# Patient Record
Sex: FEMALE | Race: WHITE | NOT HISPANIC OR LATINO | Employment: FULL TIME | URBAN - METROPOLITAN AREA
[De-identification: names, ages, dates, MRNs, and addresses within clinical notes are randomized per-mention and may not be internally consistent; named-entity substitution may affect disease eponyms.]

---

## 2018-10-10 ENCOUNTER — OFFICE VISIT (OUTPATIENT)
Dept: INTERNAL MEDICINE | Facility: CLINIC | Age: 43
End: 2018-10-10
Attending: INTERNAL MEDICINE
Payer: COMMERCIAL

## 2018-10-10 VITALS
SYSTOLIC BLOOD PRESSURE: 118 MMHG | HEIGHT: 69 IN | HEART RATE: 79 BPM | WEIGHT: 278.69 LBS | OXYGEN SATURATION: 99 % | BODY MASS INDEX: 41.28 KG/M2 | DIASTOLIC BLOOD PRESSURE: 60 MMHG

## 2018-10-10 DIAGNOSIS — Z12.39 SCREENING FOR BREAST CANCER: ICD-10-CM

## 2018-10-10 DIAGNOSIS — Z78.9 VEGAN DIET: ICD-10-CM

## 2018-10-10 DIAGNOSIS — F41.9 ANXIETY AND DEPRESSION: ICD-10-CM

## 2018-10-10 DIAGNOSIS — F32.A ANXIETY AND DEPRESSION: ICD-10-CM

## 2018-10-10 DIAGNOSIS — Z00.00 ANNUAL PHYSICAL EXAM: Primary | ICD-10-CM

## 2018-10-10 DIAGNOSIS — G47.00 INSOMNIA, UNSPECIFIED TYPE: ICD-10-CM

## 2018-10-10 PROCEDURE — 99386 PREV VISIT NEW AGE 40-64: CPT | Mod: S$GLB,,, | Performed by: INTERNAL MEDICINE

## 2018-10-10 PROCEDURE — 99999 PR PBB SHADOW E&M-NEW PATIENT-LVL IV: CPT | Mod: PBBFAC,,, | Performed by: INTERNAL MEDICINE

## 2018-10-10 RX ORDER — ESCITALOPRAM OXALATE 20 MG/1
TABLET ORAL
COMMUNITY
Start: 2017-06-20 | End: 2018-11-07

## 2018-10-10 RX ORDER — TRAZODONE HYDROCHLORIDE 100 MG/1
TABLET ORAL
COMMUNITY
Start: 2017-06-20 | End: 2019-09-05

## 2018-10-10 RX ORDER — LORAZEPAM 1 MG/1
TABLET ORAL
COMMUNITY
Start: 2017-06-20 | End: 2019-02-01

## 2018-10-10 RX ORDER — TRAZODONE HYDROCHLORIDE 100 MG/1
TABLET ORAL
Refills: 11 | COMMUNITY
Start: 2018-09-04 | End: 2018-10-10 | Stop reason: SDUPTHER

## 2018-10-10 RX ORDER — BUSPIRONE HYDROCHLORIDE 5 MG/1
5 TABLET ORAL 2 TIMES DAILY
Refills: 1 | COMMUNITY
Start: 2018-09-13 | End: 2018-10-10 | Stop reason: SDUPTHER

## 2018-10-10 RX ORDER — ESCITALOPRAM OXALATE 20 MG/1
20 TABLET ORAL DAILY
Refills: 1 | COMMUNITY
Start: 2018-08-16 | End: 2018-10-10 | Stop reason: SDUPTHER

## 2018-10-10 RX ORDER — SERTRALINE HYDROCHLORIDE 50 MG/1
50 TABLET, FILM COATED ORAL DAILY
Qty: 30 TABLET | Refills: 1 | Status: SHIPPED | OUTPATIENT
Start: 2018-10-10 | End: 2018-11-07

## 2018-10-10 RX ORDER — BUSPIRONE HYDROCHLORIDE 5 MG/1
TABLET ORAL
COMMUNITY
Start: 2018-06-20 | End: 2018-11-07

## 2018-10-10 RX ORDER — LORAZEPAM 1 MG/1
TABLET ORAL
Refills: 2 | COMMUNITY
Start: 2018-08-16 | End: 2018-10-10 | Stop reason: SDUPTHER

## 2018-10-10 NOTE — PROGRESS NOTES
Subjective:   Patient ID: Noemi Murillo is a 43 y.o. female  Chief complaint:   Chief Complaint   Patient presents with    [facility]  Pt here to [facility] care and for annual exam     Hx of anxiety and depression:  Currently taking:  buspar 5mg twice daily - would like to stop this as does not think this is helpful   lexapro 20mg - would like to stop - has been on > 1 year - mildly helpful    Lorazepam taking 1mg nightly and occ during day with panic attacks  Trazodone 200mg nightly for sleep nightly   - tried wellbutrin - felt anxiety worsened and inc panic attacks and stopped  - tried remeron - felt like brain foggy and stopped  Depression and anxiety - not in counseling due to cost currently   - not followed by psychiatry   - denies si/hi/mak    Reports awakens at night   Ended relationship with her father recently and this has worsened sleep and anxiety over past week     - awakens nervous and worrying  + snoring   - never been eval for anne      ambien not effective   Did not try lunesta   Tried benadryl, melatonin, nightly yoga meditation   Avoiding blue screens 1 hour before   Sleeping in Cool, dark room - wears ear plugs   - gets up at 3am in mornings for work and goes to sleep to 7pm   - has black out curtains   - recently switched to 6am shift 2 days per week and 4am 2 days per week      Review of Systems   Constitutional: Negative for activity change and unexpected weight change.   HENT: Positive for hearing loss and rhinorrhea. Negative for trouble swallowing.    Eyes: Positive for visual disturbance. Negative for discharge.   Respiratory: Negative for chest tightness and wheezing.    Cardiovascular: Negative for chest pain and palpitations.   Gastrointestinal: Negative for blood in stool, constipation, diarrhea and vomiting.   Endocrine: Negative for polydipsia and polyuria.   Genitourinary: Positive for menstrual problem. Negative for difficulty urinating, dysuria and hematuria.  "  Musculoskeletal: Positive for arthralgias and neck pain. Negative for joint swelling.   Skin: Negative for color change and rash.   Neurological: Positive for headaches. Negative for weakness.   Psychiatric/Behavioral: Positive for dysphoric mood and sleep disturbance. Negative for confusion, hallucinations, self-injury and suicidal ideas.     FIDE entered by pt 2 days prior to appt     Objective:  Vitals:    10/10/18 0929   BP: 118/60   Pulse: 79   SpO2: 99%   Weight: 126.4 kg (278 lb 10.6 oz)   Height: 5' 9" (1.753 m)     Body mass index is 41.15 kg/m².    Physical Exam   Constitutional: She is oriented to person, place, and time. She appears well-developed and well-nourished.   HENT:   Head: Normocephalic and atraumatic.   Right Ear: External ear normal.   Left Ear: External ear normal.   Nose: Nose normal.   Mouth/Throat: Oropharynx is clear and moist. No oropharyngeal exudate.   No carotid bruits   Eyes: Conjunctivae and EOM are normal.   Neck: Neck supple. No thyromegaly present.   Cardiovascular: Normal rate, regular rhythm, normal heart sounds and intact distal pulses.   Pulmonary/Chest: Effort normal and breath sounds normal.   Abdominal: Soft. Bowel sounds are normal.   Musculoskeletal: She exhibits no edema or tenderness.   Lymphadenopathy:     She has no cervical adenopathy.   Neurological: She is alert and oriented to person, place, and time.   Skin: Skin is warm and dry.   Psychiatric: Her behavior is normal. Thought content normal.   Vitals reviewed.      Assessment:  1. Annual physical exam    2. Vegan diet    3. Anxiety and depression    4. Insomnia, unspecified type    5. Screening for breast cancer        Plan:  Noemi was seen today for establish care.    Diagnoses and all orders for this visit:    Annual physical exam  -     Lipid panel; Future  -     TSH; Future  -     CBC auto differential; Future  -     Comprehensive metabolic panel; Future  -     Vitamin B12; Future  -     Vitamin D; " Future  Recommend daily sunscreen, cardiovascular exercise min 30 min 5 days per week. Seatbelts routinely.    Vegan diet  -     Vitamin B12; Future  -     Vitamin D; Future    Anxiety and depression  See below     Insomnia, unspecified type  -     Ambulatory consult to Sleep Disorders    Screening for breast cancer  -     Mammo Digital Screening Bilat with Tomosynthesis CAD; Future    Other orders  -     sertraline (ZOLOFT) 50 MG tablet; Take 1 tablet (50 mg total) by mouth once daily.    dec lexapro to 10mg x 7-10 days and start zoloft 50m,g   rtc in 4-6 weeks for med check     Health Maintenance   Topic Date Due    Lipid Panel  1975    Mammogram  03/31/2016    Influenza Vaccine  08/01/2018    TETANUS VACCINE  09/08/2020    Pap Smear with HPV Cotest  03/14/2021

## 2018-10-25 ENCOUNTER — PATIENT OUTREACH (OUTPATIENT)
Dept: ADMINISTRATIVE | Facility: HOSPITAL | Age: 43
End: 2018-10-25

## 2018-10-26 ENCOUNTER — PATIENT MESSAGE (OUTPATIENT)
Dept: INTERNAL MEDICINE | Facility: CLINIC | Age: 43
End: 2018-10-26

## 2018-11-05 ENCOUNTER — TELEPHONE (OUTPATIENT)
Dept: INTERNAL MEDICINE | Facility: CLINIC | Age: 43
End: 2018-11-05

## 2018-11-05 DIAGNOSIS — E03.9 ACQUIRED HYPOTHYROIDISM: ICD-10-CM

## 2018-11-05 DIAGNOSIS — D64.9 ANEMIA, UNSPECIFIED TYPE: Primary | ICD-10-CM

## 2018-11-05 LAB
1,25(OH)2D SERPL-MCNC: 35 PG/ML (ref 18–72)
1,25(OH)2D2 SERPL-MCNC: <8 PG/ML
1,25(OH)2D3 SERPL-MCNC: 35 PG/ML
ALBUMIN SERPL-MCNC: 3.9 G/DL (ref 3.6–5.1)
ALBUMIN/GLOB SERPL: 1.4 (CALC) (ref 1–2.5)
ALP SERPL-CCNC: 64 U/L (ref 33–115)
ALT SERPL-CCNC: 10 U/L (ref 6–29)
AST SERPL-CCNC: 20 U/L (ref 10–30)
BASOPHILS # BLD AUTO: 38 CELLS/UL (ref 0–200)
BASOPHILS NFR BLD AUTO: 0.5 %
BILIRUB SERPL-MCNC: 0.6 MG/DL (ref 0.2–1.2)
BUN SERPL-MCNC: 16 MG/DL (ref 7–25)
BUN/CREAT SERPL: NORMAL (CALC) (ref 6–22)
CALCIUM SERPL-MCNC: 8.6 MG/DL (ref 8.6–10.2)
CHLORIDE SERPL-SCNC: 109 MMOL/L (ref 98–110)
CHOLEST SERPL-MCNC: 151 MG/DL
CHOLEST/HDLC SERPL: 4.1 (CALC)
CO2 SERPL-SCNC: 25 MMOL/L (ref 20–32)
CREAT SERPL-MCNC: 0.62 MG/DL (ref 0.5–1.1)
EOSINOPHIL # BLD AUTO: 190 CELLS/UL (ref 15–500)
EOSINOPHIL NFR BLD AUTO: 2.5 %
ERYTHROCYTE [DISTWIDTH] IN BLOOD BY AUTOMATED COUNT: 14.3 % (ref 11–15)
GFR SERPL CREATININE-BSD FRML MDRD: 111 ML/MIN/1.73M2
GLOBULIN SER CALC-MCNC: 2.7 G/DL (CALC) (ref 1.9–3.7)
GLUCOSE SERPL-MCNC: 87 MG/DL (ref 65–99)
HCT VFR BLD AUTO: 35.9 % (ref 35–45)
HDLC SERPL-MCNC: 37 MG/DL
HGB BLD-MCNC: 11.5 G/DL (ref 11.7–15.5)
LDLC SERPL CALC-MCNC: 96 MG/DL (CALC)
LYMPHOCYTES # BLD AUTO: 1763 CELLS/UL (ref 850–3900)
LYMPHOCYTES NFR BLD AUTO: 23.2 %
MCH RBC QN AUTO: 27.8 PG (ref 27–33)
MCHC RBC AUTO-ENTMCNC: 32 G/DL (ref 32–36)
MCV RBC AUTO: 86.7 FL (ref 80–100)
MONOCYTES # BLD AUTO: 722 CELLS/UL (ref 200–950)
MONOCYTES NFR BLD AUTO: 9.5 %
NEUTROPHILS # BLD AUTO: 4887 CELLS/UL (ref 1500–7800)
NEUTROPHILS NFR BLD AUTO: 64.3 %
NONHDLC SERPL-MCNC: 114 MG/DL (CALC)
PLATELET # BLD AUTO: 256 THOUSAND/UL (ref 140–400)
PMV BLD REES-ECKER: 9.8 FL (ref 7.5–12.5)
POTASSIUM SERPL-SCNC: 4.7 MMOL/L (ref 3.5–5.3)
PROT SERPL-MCNC: 6.6 G/DL (ref 6.1–8.1)
RBC # BLD AUTO: 4.14 MILLION/UL (ref 3.8–5.1)
SODIUM SERPL-SCNC: 139 MMOL/L (ref 135–146)
TRIGL SERPL-MCNC: 89 MG/DL
TSH SERPL-ACNC: 7.02 MIU/L
VIT B12 SERPL-MCNC: 403 PG/ML (ref 200–1100)
WBC # BLD AUTO: 7.6 THOUSAND/UL (ref 3.8–10.8)

## 2018-11-05 NOTE — TELEPHONE ENCOUNTER
rec check iron labs now   rec schedule thyroid labs in 8 weeks     rec pt review my recommendations given with lab results - if she is having sx of hypothyroidism, please let me know and will order levothyroxine

## 2018-11-06 ENCOUNTER — PATIENT MESSAGE (OUTPATIENT)
Dept: INTERNAL MEDICINE | Facility: CLINIC | Age: 43
End: 2018-11-06

## 2018-11-06 DIAGNOSIS — E03.9 HYPOTHYROIDISM, UNSPECIFIED TYPE: Primary | ICD-10-CM

## 2018-11-06 RX ORDER — LEVOTHYROXINE SODIUM 25 UG/1
25 TABLET ORAL DAILY
Qty: 90 TABLET | Refills: 0 | Status: SHIPPED | OUTPATIENT
Start: 2018-11-06 | End: 2019-01-15 | Stop reason: SDUPTHER

## 2018-11-07 ENCOUNTER — OFFICE VISIT (OUTPATIENT)
Dept: INTERNAL MEDICINE | Facility: CLINIC | Age: 43
End: 2018-11-07
Attending: INTERNAL MEDICINE
Payer: COMMERCIAL

## 2018-11-07 VITALS
WEIGHT: 282.19 LBS | SYSTOLIC BLOOD PRESSURE: 123 MMHG | OXYGEN SATURATION: 98 % | DIASTOLIC BLOOD PRESSURE: 84 MMHG | HEIGHT: 69 IN | HEART RATE: 73 BPM | BODY MASS INDEX: 41.8 KG/M2

## 2018-11-07 DIAGNOSIS — D64.9 ANEMIA, UNSPECIFIED TYPE: ICD-10-CM

## 2018-11-07 DIAGNOSIS — F32.A ANXIETY AND DEPRESSION: ICD-10-CM

## 2018-11-07 DIAGNOSIS — E03.9 HYPOTHYROIDISM, UNSPECIFIED TYPE: Primary | ICD-10-CM

## 2018-11-07 DIAGNOSIS — F41.9 ANXIETY AND DEPRESSION: ICD-10-CM

## 2018-11-07 PROCEDURE — 99999 PR PBB SHADOW E&M-EST. PATIENT-LVL III: CPT | Mod: PBBFAC,,, | Performed by: INTERNAL MEDICINE

## 2018-11-07 PROCEDURE — 3008F BODY MASS INDEX DOCD: CPT | Mod: CPTII,S$GLB,, | Performed by: INTERNAL MEDICINE

## 2018-11-07 PROCEDURE — 99214 OFFICE O/P EST MOD 30 MIN: CPT | Mod: S$GLB,,, | Performed by: INTERNAL MEDICINE

## 2018-11-07 RX ORDER — SERTRALINE HYDROCHLORIDE 100 MG/1
100 TABLET, FILM COATED ORAL DAILY
Qty: 30 TABLET | Refills: 1 | Status: SHIPPED | OUTPATIENT
Start: 2018-11-07 | End: 2018-12-19

## 2018-11-07 NOTE — PROGRESS NOTES
"Subjective:   Patient ID: Noemi Murillo is a 43 y.o. female  Chief complaint:   Chief Complaint   Patient presents with    Anxiety     f/u    Depression     f/u       HPI   Pt here for f/u of   Hyopthyroidism: is to start levothyroxine today - reviewed sx of fatigue, cold intolerance, dry skin and brittle hair and nails, wt gain and discussed plan to repeat labs in 8 weeks - discussed how to take med correctly as well     Anxiety and depression: weaned off of lexapro - zoloft 50 with no se but not effective at this time. No si/h/corado   Just started with therapist     Anemia: she reports hx of mild anemia in past - currently having heavy periods - mild anemia on labs   - iron labs ordered and she is going today to complete these   - no brbpr or hematuria     Review of Systems    Objective:  Vitals:    11/07/18 1109   BP: 123/84   Pulse: 73   SpO2: 98%   Weight: 128 kg (282 lb 3 oz)   Height: 5' 9" (1.753 m)     Body mass index is 41.67 kg/m².    Physical Exam   Constitutional: She is oriented to person, place, and time. She appears well-developed and well-nourished.   HENT:   Head: Normocephalic and atraumatic.   Eyes: Conjunctivae and EOM are normal.   Neck: Normal range of motion. Neck supple.   Cardiovascular: Normal rate, regular rhythm and intact distal pulses.   Pulmonary/Chest: Effort normal and breath sounds normal.   Abdominal: Soft. Normal appearance and bowel sounds are normal.   Neurological: She is alert and oriented to person, place, and time. She has normal strength. Gait normal.   Skin: Skin is warm, dry and intact. No cyanosis. Nails show no clubbing.   Psychiatric: She has a normal mood and affect. Her speech is normal and behavior is normal. Cognition and memory are normal.   Vitals reviewed.      Assessment:  1. Hypothyroidism, unspecified type    2. Anxiety and depression    3. Anemia, unspecified type        Plan:  Noemi was seen today for anxiety and depression.    Diagnoses and all orders " for this visit:    Hypothyroidism, unspecified type  Start med   Check labs in 8 weeks     Anxiety and depression  Inc zoloft to 100mg   rtc in 4-6 weeks for med check   If not effective she will send message in 3-4 weeks before appt and will inc further   Cont with therapist     Anemia, unspecified type  Mild - attrib to heavy periods - check iron labs     Other orders  -     sertraline (ZOLOFT) 100 MG tablet; Take 1 tablet (100 mg total) by mouth once daily.    Health Maintenance   Topic Date Due    Mammogram  03/31/2016    Influenza Vaccine  08/01/2018    TETANUS VACCINE  09/08/2020    Pap Smear with HPV Cotest  03/14/2021    Lipid Panel  Completed

## 2018-11-09 LAB
FERRITIN SERPL-MCNC: 14 NG/ML (ref 10–232)
IRON SATN MFR SERPL: 12 % (CALC) (ref 11–50)
IRON SERPL-MCNC: 47 MCG/DL (ref 40–190)
T4 FREE SERPL-MCNC: 0.8 NG/DL (ref 0.8–1.8)
TIBC SERPL-MCNC: 397 MCG/DL (CALC) (ref 250–450)
TSH SERPL-ACNC: 6.5 MIU/L

## 2018-11-12 ENCOUNTER — TELEPHONE (OUTPATIENT)
Dept: INTERNAL MEDICINE | Facility: CLINIC | Age: 43
End: 2018-11-12

## 2018-11-12 DIAGNOSIS — E03.9 HYPOTHYROIDISM, UNSPECIFIED TYPE: Primary | ICD-10-CM

## 2018-11-12 NOTE — TELEPHONE ENCOUNTER
Thyroid labs checked too soon   Pt recently started on medication - please arrange labs in 8 weeks

## 2018-11-12 NOTE — TELEPHONE ENCOUNTER
Spoke to Ms. Murillo to inform her that her labs were checked too soon and informed her that Dr. Azul would like for her to arrange labs in 8 weeks.  Patient states that she will call the office back to let us know a available time and date for her.

## 2018-12-05 ENCOUNTER — PATIENT MESSAGE (OUTPATIENT)
Dept: INTERNAL MEDICINE | Facility: CLINIC | Age: 43
End: 2018-12-05

## 2018-12-05 ENCOUNTER — PATIENT OUTREACH (OUTPATIENT)
Dept: ADMINISTRATIVE | Facility: HOSPITAL | Age: 43
End: 2018-12-05

## 2018-12-19 ENCOUNTER — OFFICE VISIT (OUTPATIENT)
Dept: INTERNAL MEDICINE | Facility: CLINIC | Age: 43
End: 2018-12-19
Attending: INTERNAL MEDICINE
Payer: COMMERCIAL

## 2018-12-19 VITALS
HEIGHT: 69 IN | BODY MASS INDEX: 41.24 KG/M2 | DIASTOLIC BLOOD PRESSURE: 62 MMHG | OXYGEN SATURATION: 98 % | SYSTOLIC BLOOD PRESSURE: 106 MMHG | HEART RATE: 86 BPM | WEIGHT: 278.44 LBS

## 2018-12-19 DIAGNOSIS — E03.9 HYPOTHYROIDISM, UNSPECIFIED TYPE: Primary | ICD-10-CM

## 2018-12-19 DIAGNOSIS — F32.A ANXIETY AND DEPRESSION: ICD-10-CM

## 2018-12-19 DIAGNOSIS — F41.9 ANXIETY AND DEPRESSION: ICD-10-CM

## 2018-12-19 PROCEDURE — 99214 OFFICE O/P EST MOD 30 MIN: CPT | Mod: S$GLB,,, | Performed by: INTERNAL MEDICINE

## 2018-12-19 PROCEDURE — 3008F BODY MASS INDEX DOCD: CPT | Mod: CPTII,S$GLB,, | Performed by: INTERNAL MEDICINE

## 2018-12-19 PROCEDURE — 99999 PR PBB SHADOW E&M-EST. PATIENT-LVL III: CPT | Mod: PBBFAC,,, | Performed by: INTERNAL MEDICINE

## 2018-12-19 RX ORDER — SERTRALINE HYDROCHLORIDE 100 MG/1
150 TABLET, FILM COATED ORAL DAILY
Qty: 135 TABLET | Refills: 1 | Status: SHIPPED | OUTPATIENT
Start: 2018-12-19 | End: 2019-08-09 | Stop reason: SDUPTHER

## 2018-12-19 NOTE — PROGRESS NOTES
"Subjective:   Patient ID: Noemi Murillo is a 43 y.o. female  Chief complaint: No chief complaint on file.      HPI   Pt here for f/u of hypothyroidism and med check - zoloft     Hypothyroidism: Less cold intol and dry skin   sigrid med   Due for repeat labs in 2-3 weeks [facility]     Anxiety and depression - reviewed email 12/5 - inc stress at work due to holidays and inc work load however feeling better overall   zoloft - sigrid med   Doing well with therapy at this time - mood has improved   Working to inc exercise - reports very active work day     Will schedule mmg - order is signed    Review of Systems    Objective:  Vitals:    12/19/18 1003   BP: 106/62   Pulse: 86   SpO2: 98%   Weight: 126.3 kg (278 lb 7.1 oz)   Height: 5' 9" (1.753 m)     Body mass index is 41.12 kg/m².    Physical Exam   Constitutional: She is oriented to person, place, and time. She appears well-developed and well-nourished.   HENT:   Head: Normocephalic and atraumatic.   Eyes: Conjunctivae and EOM are normal.   Neck: Normal range of motion. Neck supple.   Cardiovascular: Normal rate, regular rhythm and intact distal pulses.   Pulmonary/Chest: Effort normal and breath sounds normal.   Abdominal: Soft. Normal appearance and bowel sounds are normal.   Musculoskeletal: She exhibits no edema or tenderness.   Neurological: She is alert and oriented to person, place, and time. She has normal strength. Gait normal.   Skin: Skin is warm, dry and intact. No cyanosis. Nails show no clubbing.   Psychiatric: She has a normal mood and affect. Her speech is normal and behavior is normal. Cognition and memory are normal.   Vitals reviewed.      Assessment:  1. Hypothyroidism, unspecified type    2. Anxiety and depression        Plan:  Diagnoses and all orders for this visit:    Hypothyroidism, unspecified type  -     TSH; Future  -     T4, free; Future  Cont med - check labs   Will titrate med based on lab findings    Anxiety and depression  Improved - " will inc zoloft to 150mg - she will let me know if any issues with inc dose   - if doing well on current dose then will send message in 4-6 weeks and will send in refills - 90 day     Other orders  -     sertraline (ZOLOFT) 100 MG tablet; Take 1.5 tablets (150 mg total) by mouth once daily.    Health Maintenance   Topic Date Due    Mammogram  03/31/2016    TETANUS VACCINE  09/08/2020    Pap Smear with HPV Cotest  03/14/2021    Influenza Vaccine  Completed    Lipid Panel  Completed

## 2019-01-10 ENCOUNTER — PATIENT MESSAGE (OUTPATIENT)
Dept: INTERNAL MEDICINE | Facility: CLINIC | Age: 44
End: 2019-01-10

## 2019-01-13 ENCOUNTER — PATIENT MESSAGE (OUTPATIENT)
Dept: INTERNAL MEDICINE | Facility: CLINIC | Age: 44
End: 2019-01-13

## 2019-01-13 DIAGNOSIS — E03.9 HYPOTHYROIDISM, UNSPECIFIED TYPE: Primary | ICD-10-CM

## 2019-01-14 ENCOUNTER — TELEPHONE (OUTPATIENT)
Dept: OBSTETRICS AND GYNECOLOGY | Facility: CLINIC | Age: 44
End: 2019-01-14

## 2019-01-14 NOTE — TELEPHONE ENCOUNTER
rec check thyroid labs now - [facility] - agree with going today     Needs to f/u with gyn for irregular periods    Is she still taking trazodone? I have 200mg written in my note at first appt but 100mg is listed in chart - please clarify with pt     Recommend continue good sleep hygiene and work to increase exercise to help with mood and sleep.   Will consider other med changes after thyroid labs return.     Please let her know recommendations

## 2019-01-14 NOTE — TELEPHONE ENCOUNTER
"----- Message from Bettina Bloom sent at 1/14/2019 11:19 AM CST -----  Contact: LUZ MORENO [[id number]]  Name of Who is Calling: LUZ MORENO [[id number]]      What is the request in detail:    Patient called and said, "she's returning your call and would like you to please call again."      THANKS!      Can the clinic reply by [facility]:  NO      Number to Call Back: LUZ MORENO / # [phone]                                    "

## 2019-01-14 NOTE — TELEPHONE ENCOUNTER
Spoke with pt and she states that she takes 200mg of Trazadone and by itself does not work well. Checking with Dr. Azul to see if the [facility] have [facility] no lab were visible that they are ordered. Pt going to lab later to day

## 2019-01-14 NOTE — TELEPHONE ENCOUNTER
----- Message from Silvina Bruno sent at 1/14/2019  9:51 AM CST -----  Contact: LUZ MORENO [[id number]]  Name of Who is Calling: LUZ MORENO [[id number]]      What is the request in detail: patient would like to schedule new patient appt for irregular cycle. Please call       Can the clinic reply by [facility]: no    What Number to Call Back if not in [facility]: [phone]

## 2019-01-14 NOTE — TELEPHONE ENCOUNTER
Thank you for clarifying trazodone dose    Reordered tsh and free t4 just in case prior have  - through [facility]     Irregular periods may be in part due to hypothyroidism - will make further adjustments to med pending those results

## 2019-01-15 ENCOUNTER — OFFICE VISIT (OUTPATIENT)
Dept: OBSTETRICS AND GYNECOLOGY | Facility: CLINIC | Age: 44
End: 2019-01-15
Payer: COMMERCIAL

## 2019-01-15 ENCOUNTER — TELEPHONE (OUTPATIENT)
Dept: INTERNAL MEDICINE | Facility: CLINIC | Age: 44
End: 2019-01-15

## 2019-01-15 VITALS
BODY MASS INDEX: 41.55 KG/M2 | SYSTOLIC BLOOD PRESSURE: 120 MMHG | DIASTOLIC BLOOD PRESSURE: 88 MMHG | HEIGHT: 69 IN | WEIGHT: 280.56 LBS

## 2019-01-15 DIAGNOSIS — E03.9 ACQUIRED HYPOTHYROIDISM: Primary | ICD-10-CM

## 2019-01-15 DIAGNOSIS — N92.6 IRREGULAR PERIODS/MENSTRUAL CYCLES: Primary | ICD-10-CM

## 2019-01-15 LAB
T4 FREE SERPL-MCNC: 0.9 NG/DL (ref 0.8–1.8)
TSH SERPL-ACNC: 8.24 MIU/L

## 2019-01-15 PROCEDURE — 99203 PR OFFICE/OUTPT VISIT, NEW, LEVL III, 30-44 MIN: ICD-10-PCS | Mod: S$GLB,,, | Performed by: OBSTETRICS & GYNECOLOGY

## 2019-01-15 PROCEDURE — 3008F BODY MASS INDEX DOCD: CPT | Mod: CPTII,S$GLB,, | Performed by: OBSTETRICS & GYNECOLOGY

## 2019-01-15 PROCEDURE — 3008F PR BODY MASS INDEX (BMI) DOCUMENTED: ICD-10-PCS | Mod: CPTII,S$GLB,, | Performed by: OBSTETRICS & GYNECOLOGY

## 2019-01-15 PROCEDURE — 99999 PR PBB SHADOW E&M-EST. PATIENT-LVL III: CPT | Mod: PBBFAC,,, | Performed by: OBSTETRICS & GYNECOLOGY

## 2019-01-15 PROCEDURE — 99999 PR PBB SHADOW E&M-EST. PATIENT-LVL III: ICD-10-PCS | Mod: PBBFAC,,, | Performed by: OBSTETRICS & GYNECOLOGY

## 2019-01-15 PROCEDURE — 99203 OFFICE O/P NEW LOW 30 MIN: CPT | Mod: S$GLB,,, | Performed by: OBSTETRICS & GYNECOLOGY

## 2019-01-15 RX ORDER — LEVOTHYROXINE SODIUM 50 UG/1
50 TABLET ORAL DAILY
Qty: 60 TABLET | Refills: 0 | Status: SHIPPED | OUTPATIENT
Start: 2019-01-15 | End: 2019-02-01

## 2019-01-15 NOTE — TELEPHONE ENCOUNTER
Message sent to pt via my chart with lab results and updates to plan.   Inc dose of levothyroxine to 50mcg daily   Please arrange tsh and free t4 in 6 weeks - please change to [facility] - thanks!

## 2019-01-29 ENCOUNTER — PATIENT MESSAGE (OUTPATIENT)
Dept: INTERNAL MEDICINE | Facility: CLINIC | Age: 44
End: 2019-01-29

## 2019-01-30 ENCOUNTER — PATIENT OUTREACH (OUTPATIENT)
Dept: ADMINISTRATIVE | Facility: HOSPITAL | Age: 44
End: 2019-01-30

## 2019-01-30 NOTE — TELEPHONE ENCOUNTER
Needs appt to evaluate sx of weakness, dizziness/lightheadedness, lethargy - please arrange urgent appt today or ros as cause of this is unclear     Can increase zoloft to 200mg to help with irritability, poor sleep and depression - please let me know if pt would like to try this.     rec cont thyroid medication and will complete labs in 4 weeks for this

## 2019-02-01 ENCOUNTER — OFFICE VISIT (OUTPATIENT)
Dept: INTERNAL MEDICINE | Facility: CLINIC | Age: 44
End: 2019-02-01
Attending: INTERNAL MEDICINE
Payer: COMMERCIAL

## 2019-02-01 VITALS
WEIGHT: 282.63 LBS | DIASTOLIC BLOOD PRESSURE: 80 MMHG | HEIGHT: 69 IN | OXYGEN SATURATION: 98 % | HEART RATE: 85 BPM | SYSTOLIC BLOOD PRESSURE: 121 MMHG | BODY MASS INDEX: 41.86 KG/M2

## 2019-02-01 DIAGNOSIS — F32.A ANXIETY AND DEPRESSION: ICD-10-CM

## 2019-02-01 DIAGNOSIS — F51.01 PRIMARY INSOMNIA: ICD-10-CM

## 2019-02-01 DIAGNOSIS — E03.9 HYPOTHYROIDISM, UNSPECIFIED TYPE: Primary | ICD-10-CM

## 2019-02-01 DIAGNOSIS — H53.9 VISION CHANGES: ICD-10-CM

## 2019-02-01 DIAGNOSIS — H81.10 BENIGN PAROXYSMAL POSITIONAL VERTIGO, UNSPECIFIED LATERALITY: ICD-10-CM

## 2019-02-01 DIAGNOSIS — F41.9 ANXIETY AND DEPRESSION: ICD-10-CM

## 2019-02-01 PROCEDURE — 99999 PR PBB SHADOW E&M-EST. PATIENT-LVL III: ICD-10-PCS | Mod: PBBFAC,,, | Performed by: INTERNAL MEDICINE

## 2019-02-01 PROCEDURE — 3008F BODY MASS INDEX DOCD: CPT | Mod: CPTII,S$GLB,, | Performed by: INTERNAL MEDICINE

## 2019-02-01 PROCEDURE — 99999 PR PBB SHADOW E&M-EST. PATIENT-LVL III: CPT | Mod: PBBFAC,,, | Performed by: INTERNAL MEDICINE

## 2019-02-01 PROCEDURE — 99214 PR OFFICE/OUTPT VISIT, EST, LEVL IV, 30-39 MIN: ICD-10-PCS | Mod: S$GLB,,, | Performed by: INTERNAL MEDICINE

## 2019-02-01 PROCEDURE — 3008F PR BODY MASS INDEX (BMI) DOCUMENTED: ICD-10-PCS | Mod: CPTII,S$GLB,, | Performed by: INTERNAL MEDICINE

## 2019-02-01 PROCEDURE — 99214 OFFICE O/P EST MOD 30 MIN: CPT | Mod: S$GLB,,, | Performed by: INTERNAL MEDICINE

## 2019-02-01 RX ORDER — LANOLIN ALCOHOL/MO/W.PET/CERES
400 CREAM (GRAM) TOPICAL DAILY
Refills: 0 | COMMUNITY
Start: 2019-02-01 | End: 2019-06-04

## 2019-02-01 RX ORDER — SUMATRIPTAN SUCCINATE 25 MG/1
TABLET ORAL
Qty: 15 TABLET | Refills: 0 | Status: SHIPPED | OUTPATIENT
Start: 2019-02-01 | End: 2019-02-13 | Stop reason: SDUPTHER

## 2019-02-01 RX ORDER — MECLIZINE HCL 12.5 MG 12.5 MG/1
12.5 TABLET ORAL 3 TIMES DAILY PRN
Qty: 60 TABLET | Refills: 0 | Status: SHIPPED | OUTPATIENT
Start: 2019-02-01 | End: 2019-02-13 | Stop reason: SDUPTHER

## 2019-02-01 RX ORDER — LEVOTHYROXINE SODIUM 75 UG/1
75 TABLET ORAL DAILY
Qty: 30 TABLET | Refills: 1 | Status: SHIPPED | OUTPATIENT
Start: 2019-02-01 | End: 2019-02-25 | Stop reason: SDUPTHER

## 2019-02-01 NOTE — PROGRESS NOTES
"Subjective:   Patient ID: Noemi Murillo is a 43 y.o. female  Chief complaint:   Chief Complaint   Patient presents with    Dizziness    Headache       HPI  Pt here for f/u     Depression: has improved since starting zoloft - is taking med consistently and sigrid 150mg daily at this time    Hypothyroidism:   Started levothyroxine 50mcg after last TSH higher   - reports more hypothyroid symptoms: dry skin, hair loss, fatigue   - taking on empty stomach - no other food or liquids x 1 hour and then eats breakfast about 1 hour later  Started new dose about 2 weeks ago    Reports mood and sleep worse when on period which ended yesterday.   Going to sleep but waking   - no snoring  - taking trazodone 200mg nightly     Reports more frequent headaches over past 2 weeks   - locate at left side of head and sharp pain behind left eye   No sinus congestion, post nasal drip, sore throat  No fevers or chills  No double vision  No cough  Vision is occ blurry when reading fine print up close  Last eye exam > 10 years ago   Reports has noticed more difficulty with lighter print up close   - had not found readers at drug store due to cost   - pressure around front of head   - more intesne over past 2 weeks - typically ha are more frequent and intense with periods which just ended yesterday    Reports over past 2 weeks has feeling of lightheadedness described as room spinning. At times feels an unsteadiness on feet   - will be triggered when turns head quickly - has not noticed if one side worse than other  - no sinus sx as above   - no falls or head trauma, no double vision, numbness or focal weakness, dysuria  - has n/v/d when period - period just finished yesterday and had these episodes - no emesis today   No recent URI  Partner with vascectomy and just finished perio    Review of Systems    Objective:  Vitals:    02/01/19 1007   BP: 121/80   Pulse: 85   SpO2: 98%   Weight: 128.2 kg (282 lb 10.1 oz)   Height: 5' 9" (1.753 m) "     Body mass index is 41.74 kg/m².    Physical Exam   Constitutional: She is oriented to person, place, and time. She appears well-developed and well-nourished.   HENT:   Head: Normocephalic and atraumatic.   Right Ear: External ear normal.   Left Ear: External ear normal.   Nose: Nose normal.   Mouth/Throat: Oropharynx is clear and moist. No oropharyngeal exudate.   No carotid bruits  No effusion  vertigo triggered by eye movement to left and turning head to left quickly   Eyes: Conjunctivae and EOM are normal. Pupils are equal, round, and reactive to light.   Neck: Neck supple. No thyromegaly present.   Cardiovascular: Normal rate, regular rhythm, normal heart sounds and intact distal pulses.   Pulmonary/Chest: Effort normal and breath sounds normal.   Abdominal: Soft. Bowel sounds are normal.   Musculoskeletal: She exhibits no edema or tenderness.   PERRLA, EOMI  No visual field defects  + facial symmetry and sensation in tact  Hearing in tact bilaterally  SCM and SS in tact  Tongue and uvula midline  Tongue strength in tact  Light touch in tact bilateral UE and LE  Gait normal  5/5 strength throughout  +2 reflexes throughout  DANIS in tact UE and LE  Neg Romberg        Lymphadenopathy:     She has no cervical adenopathy.   Neurological: She is alert and oriented to person, place, and time.   Skin: Skin is warm and dry. Capillary refill takes less than 2 seconds.   Psychiatric: Her behavior is normal. Thought content normal.   Vitals reviewed.      Assessment:  1. Hypothyroidism, unspecified type    2. Vision changes    3. Anxiety and depression    4. Primary insomnia    5. Benign paroxysmal positional vertigo, unspecified laterality        Plan:  Noemi was seen today for dizziness and headache.    Diagnoses and all orders for this visit:    Hypothyroidism, unspecified type  -     levothyroxine (SYNTHROID) 75 MCG tablet; Take 1 tablet (75 mcg total) by mouth once daily.  check thyroid labs as scheduled      Vision changes  -     Ambulatory Referral to Optometry  Describes difficulty with small print - near vision -rec f/u for eye exam     Anxiety and depression  Improved - cont zoloft 150    Primary insomnia  Cont trazodone  Graded exercise program  Discussed good sleep hygiene and will cont     Benign paroxysmal positional vertigo, unspecified laterality  Trial of meclizine - counseled on pot SE     Other orders  -     meclizine (ANTIVERT) 12.5 mg tablet; Take 1 tablet (12.5 mg total) by mouth 3 (three) times daily as needed for Dizziness.    HA: multifactorial - tension, menstrual   - trial of magnesium nightly  - imitrex given for prn if otc meds not effective   -     magnesium oxide (MAG-OX) 400 mg (241.3 mg magnesium) tablet; Take 1 tablet (400 mg total) by mouth once daily.  -     sumatriptan (IMITREX) 25 MG Tab; Take 25mg as needed for migraine, ok to repeat dose x 1 in 1 hour is still with migraine, do not exceed 200mg/24h    does not think will be able to afford eye exam due to deductible - will consider     Health Maintenance   Topic Date Due    Mammogram  03/31/2016    TETANUS VACCINE  09/08/2020    Pap Smear with HPV Cotest  03/14/2021    Influenza Vaccine  Completed    Lipid Panel  Completed

## 2019-02-04 ENCOUNTER — TELEPHONE (OUTPATIENT)
Dept: INTERNAL MEDICINE | Facility: CLINIC | Age: 44
End: 2019-02-04

## 2019-02-04 NOTE — TELEPHONE ENCOUNTER
Left voice message for patient to schedule appointment from referral to Optometry Clinic.  Shubham WHITTINGTON  [phone]

## 2019-02-13 ENCOUNTER — OFFICE VISIT (OUTPATIENT)
Dept: INTERNAL MEDICINE | Facility: CLINIC | Age: 44
End: 2019-02-13
Attending: INTERNAL MEDICINE
Payer: COMMERCIAL

## 2019-02-13 VITALS
HEART RATE: 67 BPM | OXYGEN SATURATION: 98 % | HEIGHT: 69 IN | BODY MASS INDEX: 41.9 KG/M2 | WEIGHT: 282.88 LBS | SYSTOLIC BLOOD PRESSURE: 116 MMHG | DIASTOLIC BLOOD PRESSURE: 80 MMHG

## 2019-02-13 DIAGNOSIS — R42 VERTIGO: ICD-10-CM

## 2019-02-13 DIAGNOSIS — G47.00 INSOMNIA, UNSPECIFIED TYPE: ICD-10-CM

## 2019-02-13 DIAGNOSIS — G44.209 TENSION HEADACHE: ICD-10-CM

## 2019-02-13 DIAGNOSIS — F41.9 ANXIETY AND DEPRESSION: ICD-10-CM

## 2019-02-13 DIAGNOSIS — E03.9 HYPOTHYROIDISM, UNSPECIFIED TYPE: Primary | ICD-10-CM

## 2019-02-13 DIAGNOSIS — F32.A ANXIETY AND DEPRESSION: ICD-10-CM

## 2019-02-13 PROCEDURE — 3008F PR BODY MASS INDEX (BMI) DOCUMENTED: ICD-10-PCS | Mod: CPTII,S$GLB,, | Performed by: INTERNAL MEDICINE

## 2019-02-13 PROCEDURE — 3008F BODY MASS INDEX DOCD: CPT | Mod: CPTII,S$GLB,, | Performed by: INTERNAL MEDICINE

## 2019-02-13 PROCEDURE — 99214 PR OFFICE/OUTPT VISIT, EST, LEVL IV, 30-39 MIN: ICD-10-PCS | Mod: S$GLB,,, | Performed by: INTERNAL MEDICINE

## 2019-02-13 PROCEDURE — 99999 PR PBB SHADOW E&M-EST. PATIENT-LVL III: ICD-10-PCS | Mod: PBBFAC,,, | Performed by: INTERNAL MEDICINE

## 2019-02-13 PROCEDURE — 99999 PR PBB SHADOW E&M-EST. PATIENT-LVL III: CPT | Mod: PBBFAC,,, | Performed by: INTERNAL MEDICINE

## 2019-02-13 PROCEDURE — 99214 OFFICE O/P EST MOD 30 MIN: CPT | Mod: S$GLB,,, | Performed by: INTERNAL MEDICINE

## 2019-02-13 RX ORDER — MECLIZINE HCL 12.5 MG 12.5 MG/1
12.5 TABLET ORAL 3 TIMES DAILY PRN
Qty: 60 TABLET | Refills: 6 | Status: SHIPPED | OUTPATIENT
Start: 2019-02-13 | End: 2020-12-02

## 2019-02-13 RX ORDER — SUMATRIPTAN SUCCINATE 25 MG/1
TABLET ORAL
Qty: 15 TABLET | Refills: 0 | Status: SHIPPED | OUTPATIENT
Start: 2019-02-13 | End: 2019-05-20 | Stop reason: SDUPTHER

## 2019-02-13 NOTE — PROGRESS NOTES
"Subjective:   Patient ID: Noemi Murillo is a 43 y.o. female  Chief complaint:   Chief Complaint   Patient presents with    Anxiety     f/u    Hyperthyroidism     f/u       HPI    Pt here for f/u hypothyrdoisism  Doing better than when last seen since starting the hypothyroidism 75mcg dose  - more energy, skin less dry   - less days of menstrual bleeding since starting 75mcg of levothyroxine    Vertigo improved - meclizine effective when needed    Stopped etoh - 1 -2 drinks nightly - and has more energy with this     Tension Headaches: improved and less frequent -  occurring moreat end of work week   after four 10h stretches instead of daily as prev  Taking magnesium and this has helped   Took imitrex only prn x 2 occ and this was effective when needed     Mood is good on zoloft     Review of Systems    Objective:  Vitals:    02/13/19 1033   BP: 116/80   Pulse: 67   SpO2: 98%   Weight: 128.3 kg (282 lb 13.6 oz)   Height: 5' 9" (1.753 m)     Body mass index is 41.77 kg/m².    Physical Exam   Constitutional: She is oriented to person, place, and time. She appears well-developed and well-nourished.   HENT:   Head: Normocephalic and atraumatic.   Right Ear: External ear normal.   Left Ear: External ear normal.   Nose: Nose normal.   Mouth/Throat: Oropharynx is clear and moist.   Eyes: Conjunctivae and EOM are normal.   Neck: Normal range of motion. Neck supple.   Cardiovascular: Normal rate, regular rhythm and intact distal pulses.   Pulmonary/Chest: Effort normal and breath sounds normal.   Abdominal: Soft. Normal appearance and bowel sounds are normal.   Musculoskeletal: She exhibits no edema or tenderness.   Neurological: She is alert and oriented to person, place, and time. She has normal strength. Coordination and gait normal.   Skin: Skin is warm, dry and intact. No cyanosis. Nails show no clubbing.   Psychiatric: She has a normal mood and affect. Her speech is normal and behavior is normal. Cognition and memory " are normal.   Vitals reviewed.    Assessment:  1. Hypothyroidism, unspecified type    2. Vertigo    3. Anxiety and depression    4. Insomnia, unspecified type    5. Tension headache        Plan:  Noemi was seen today for anxiety and hyperthyroidism.    Diagnoses and all orders for this visit:    Hypothyroidism, unspecified type  Cont current dose  Check TFT's in 1-2 weeks   If TSH sign improved will cont current dose and repeat in 6-8 weeks   If not improved will cont to inc levothyroxine     Vertigo  -     Ambulatory Referral to ENT  Improved - cont prn med   Refer to ENT if sx do not resolve   Given handout on vertigo     Anxiety and depression  Improved, cont zoloft at current dose     Insomnia, unspecified type  Cont med    Tension headache  Improved - cont mag suppl nightly and prn med as needed   Er and rtc prompts given     Refills given   -     meclizine (ANTIVERT) 12.5 mg tablet; Take 1 tablet (12.5 mg total) by mouth 3 (three) times daily as needed for Dizziness.  -     sumatriptan (IMITREX) 25 MG Tab; Take 25mg as needed for migraine, ok to repeat dose x 1 in 1 hour is still with migraine, do not exceed 200mg/24h        Health Maintenance   Topic Date Due    Mammogram  03/31/2016    TETANUS VACCINE  09/08/2020    Pap Smear with HPV Cotest  03/14/2021    Influenza Vaccine  Completed    Lipid Panel  Completed

## 2019-02-22 LAB
T4 FREE SERPL-MCNC: 0.9 NG/DL (ref 0.8–1.8)
TSH SERPL-ACNC: 2.93 MIU/L

## 2019-02-25 ENCOUNTER — TELEPHONE (OUTPATIENT)
Dept: INTERNAL MEDICINE | Facility: CLINIC | Age: 44
End: 2019-02-25

## 2019-02-25 DIAGNOSIS — E03.9 HYPOTHYROIDISM, UNSPECIFIED TYPE: Primary | ICD-10-CM

## 2019-02-25 RX ORDER — LEVOTHYROXINE SODIUM 75 UG/1
75 TABLET ORAL DAILY
Qty: 90 TABLET | Refills: 0 | Status: SHIPPED | OUTPATIENT
Start: 2019-02-25 | End: 2019-03-22 | Stop reason: SDUPTHER

## 2019-02-25 NOTE — TELEPHONE ENCOUNTER
Message sent to pt via my chart with lab results and updates to plan.      Please arrange thyroid labs in 6-8 weeks

## 2019-03-22 RX ORDER — LEVOTHYROXINE SODIUM 75 UG/1
75 TABLET ORAL DAILY
Qty: 90 TABLET | Refills: 0 | Status: SHIPPED | OUTPATIENT
Start: 2019-03-22 | End: 2019-05-13 | Stop reason: SDUPTHER

## 2019-03-22 RX ORDER — LEVOTHYROXINE SODIUM 50 UG/1
TABLET ORAL
Qty: 60 TABLET | Refills: 0 | OUTPATIENT
Start: 2019-03-22

## 2019-05-08 ENCOUNTER — PATIENT MESSAGE (OUTPATIENT)
Dept: INTERNAL MEDICINE | Facility: CLINIC | Age: 44
End: 2019-05-08

## 2019-05-11 LAB
T4 FREE SERPL-MCNC: 0.9 NG/DL (ref 0.8–1.8)
TSH SERPL-ACNC: 2.76 MIU/L

## 2019-05-13 RX ORDER — LEVOTHYROXINE SODIUM 75 UG/1
75 TABLET ORAL DAILY
Qty: 90 TABLET | Refills: 3 | Status: SHIPPED | OUTPATIENT
Start: 2019-05-13 | End: 2019-05-14

## 2019-05-14 ENCOUNTER — PATIENT MESSAGE (OUTPATIENT)
Dept: INTERNAL MEDICINE | Facility: CLINIC | Age: 44
End: 2019-05-14

## 2019-05-14 ENCOUNTER — TELEPHONE (OUTPATIENT)
Dept: ENDOCRINOLOGY | Facility: CLINIC | Age: 44
End: 2019-05-14

## 2019-05-14 DIAGNOSIS — E03.9 HYPOTHYROIDISM, UNSPECIFIED TYPE: Primary | ICD-10-CM

## 2019-05-14 RX ORDER — LEVOTHYROXINE SODIUM 88 UG/1
88 TABLET ORAL
Qty: 90 TABLET | Refills: 1 | Status: SHIPPED | OUTPATIENT
Start: 2019-05-14 | End: 2019-06-04

## 2019-05-14 NOTE — TELEPHONE ENCOUNTER
----- Message from Martha Pablo sent at 5/14/2019  3:49 PM CDT -----  Contact: self [phone]  ..Needs Advice    Reason for call:        Communication Preference:phone    Additional Information:please call patient back asap she is very historical states no one called before making the apt please call back to discuss

## 2019-05-14 NOTE — TELEPHONE ENCOUNTER
Message sent to pt via my chart with lab results and updates to plan.    Please arrange thyroid labs in 8 weeks

## 2019-05-14 NOTE — TELEPHONE ENCOUNTER
Talk to she was crying, barley can talk she is a new to endocrine she was seeing  dr rothman in September but stated that she can not wait that a long I ask her if she she don't mind seeing a np pt said she did not care. I looked at everyone scheduled latanya was the only provider with a June appt. Pt said that a np at [facility] had open on 5/15 but I made pt aware that the np only sees diabetes pt not general endocrine. Pt understood our conversation while still in a  hysterical state. Pt said something is wrong with her. Primary care doctor told her they can not do anything for her. She need to see one of provider soon. I made the appt for the patient I repeat to her the date and time of the appt. Yari pelaez name also repeat she is a np. Pt thank me for trying to find a sooner appt for her. End of our phone conversation

## 2019-05-20 DIAGNOSIS — G44.209 TENSION HEADACHE: Primary | ICD-10-CM

## 2019-05-20 RX ORDER — SUMATRIPTAN SUCCINATE 25 MG/1
TABLET ORAL
Qty: 15 TABLET | Refills: 3 | Status: SHIPPED | OUTPATIENT
Start: 2019-05-20 | End: 2020-12-02

## 2019-06-03 NOTE — PROGRESS NOTES
Subjective:      Patient ID: Noemi Murillo is a 44 y.o. female.    Chief Complaint:  Consult    History of Present Illness  Noemi Murillo presents today for Evaluation & management of hypothyroidism. This is her  first visit with me. Referred by Dr. MAYCOL Azul MD.    With regards to her hypothyroidism:    Current medication:  generic lt4 88 mcg dose once daily     Takes thyroid medication properly without food first thing in the morning.     current symptoms:   + weight gain  + Fatigue   Denies Constipation; frequent bowel movements    + Hair loss  + Brittle nails  Denies Mental fog   No cp, palpations or sob  Denies heat intolerance  +Cold intolerance     Menstrual history:  Regular Cycles: LMP 5/15/2019   Pregnancy plans: none;  had history of vasectomy      Ref. Range 5/10/2019 12:41   TSH Latest Units: mIU/L 2.76   T4, Free Latest Ref Range: 0.8 - 1.8 ng/dL 0.9     Review of Systems   Constitutional: Positive for activity change, appetite change, fatigue (extreme) and unexpected weight change (weight gain).   Eyes: Negative for visual disturbance.   Respiratory: Negative for cough and shortness of breath.    Cardiovascular: Negative for chest pain.   Gastrointestinal: Negative for abdominal pain.   Endocrine: Positive for cold intolerance. Negative for heat intolerance, polydipsia, polyphagia and polyuria.   Musculoskeletal: Negative for arthralgias.   Skin: Negative for wound.        Hair loss; brittle nails    Neurological: Positive for dizziness and headaches.   Hematological: Does not bruise/bleed easily.   Psychiatric/Behavioral: Positive for decreased concentration and sleep disturbance (insomnia). The patient is nervous/anxious.      Objective:   Physical Exam   Constitutional: She appears well-developed.   HENT:   Right Ear: External ear normal.   Left Ear: External ear normal.   Nose: Nose normal.   Hearing Normal     Neck: No tracheal deviation present. No thyromegaly present.   No nodules.    Cardiovascular: Normal rate.   No murmur heard.  No edema present   Pulmonary/Chest: Effort normal and breath sounds normal.   Abdominal: Soft. She exhibits no mass. No hernia.   Neurological: She is alert. No cranial nerve deficit or sensory deficit.   Skin: No rash noted.   Psychiatric: She has a normal mood and affect. Judgment normal.   Vitals reviewed.    Body mass index is 40.66 kg/m².    Lab Review:   No results found for: HGBA1C  Lab Results   Component Value Date    CHOL 151 10/31/2018    HDL 37 (L) 10/31/2018    LDLCALC 96 10/31/2018    TRIG 89 10/31/2018    CHOLHDL 4.1 10/31/2018     Lab Results   Component Value Date     10/31/2018    K 4.7 10/31/2018     10/31/2018    CO2 25 10/31/2018    GLU 87 10/31/2018    BUN 16 10/31/2018    CREATININE 0.62 10/31/2018    CALCIUM 8.6 10/31/2018    PROT 6.6 10/31/2018    ALBUMIN 3.9 10/31/2018    BILITOT 0.6 10/31/2018    ALKPHOS 64 10/31/2018    AST 20 10/31/2018    ALT 10 10/31/2018    ANIONGAP 19 02/23/2011    ESTGFRAFRICA 129 10/31/2018    EGFRNONAA 111 10/31/2018    TSH 2.76 05/10/2019       Assessment and Plan     1. Hypothyroidism, unspecified type  TSH    Hemoglobin A1c    Vitamin D    Ambulatory referral to ENT   2. Anxiety and depression     3. Class 3 severe obesity with body mass index (BMI) of 40.0 to 44.9 in adult, unspecified obesity type, unspecified whether serious comorbidity present     4. Anemia, unspecified type       Hypothyroidism  -- Clinically hypothyroid and biochemically hypothyroid  -- Goal is a normal TSH  -- Increase levothyroxine 100 mcg once daily; check tsh levels in 6 weeks   -- Avoid exogenous hyperthyroidism as this can accelerate bone loss and increase risk of CV complications.  -- Advised to take LT4 on an empty stomach with water and to wait 30-45 minutes before eating or taking other medications   -- Reviewed usual  times of thyroid hormone  changes- call if start/ stop ocps, weight changes, attempting conception  and during pregnancy   -- Reviewed that symptoms of hypothyroidism may not correlate withTSH, and a normal TSH is the goal of therapy. Symptoms are not a justification for over treatment.  -- Pt seemed most concerned about symptoms of headache, vertigo, and fatigue. Referral placed to ENT to rule out inner ear disease; pt agreed     Anxiety and depression  -- pt very upset and crying throughout visit. She voiced that she is very concerned that she is experiencing all of her symptoms due to her thyroid levels  -- I reviewed that symptoms of hypothyroidism may not correlate directly with TSH  -- Pt states she is followed by her psychologist   -- Offered to refer to sleep medicine to address issue of insomnia; pt declined     Class 3 severe obesity with body mass index (BMI) of 40.0 to 44.9 in adult  -- alerted as to the increased risk of T2DM   -- encouraged dietary and lifestyle modifications   -- emphasized weight loss goals   -- Recommended referral to bariatric medicine; pt declined   -- recommend periodic A1c; will check with next set of labs       Anemia  -- hx of anemia; does not take any iron supplements   -- will follow cbc with next set of labs       Follow up in about 1 year (around 6/4/2020).  Labs in 6 weeks.     Case discussed with Dr. Jacobsen.   Recommendations were discussed with the patient in detail  The patient verbalized understanding and agrees with the plan outlined as above.

## 2019-06-04 ENCOUNTER — OFFICE VISIT (OUTPATIENT)
Dept: ENDOCRINOLOGY | Facility: CLINIC | Age: 44
End: 2019-06-04
Payer: COMMERCIAL

## 2019-06-04 VITALS
HEIGHT: 69 IN | HEART RATE: 80 BPM | WEIGHT: 275.38 LBS | DIASTOLIC BLOOD PRESSURE: 80 MMHG | RESPIRATION RATE: 16 BRPM | SYSTOLIC BLOOD PRESSURE: 120 MMHG | BODY MASS INDEX: 40.79 KG/M2

## 2019-06-04 DIAGNOSIS — E03.9 HYPOTHYROIDISM, UNSPECIFIED TYPE: Primary | ICD-10-CM

## 2019-06-04 DIAGNOSIS — F41.9 ANXIETY AND DEPRESSION: ICD-10-CM

## 2019-06-04 DIAGNOSIS — F32.A ANXIETY AND DEPRESSION: ICD-10-CM

## 2019-06-04 DIAGNOSIS — E66.01 CLASS 3 SEVERE OBESITY WITH BODY MASS INDEX (BMI) OF 40.0 TO 44.9 IN ADULT, UNSPECIFIED OBESITY TYPE, UNSPECIFIED WHETHER SERIOUS COMORBIDITY PRESENT: ICD-10-CM

## 2019-06-04 DIAGNOSIS — D64.9 ANEMIA, UNSPECIFIED TYPE: ICD-10-CM

## 2019-06-04 PROBLEM — E66.813 CLASS 3 SEVERE OBESITY WITH BODY MASS INDEX (BMI) OF 40.0 TO 44.9 IN ADULT: Status: ACTIVE | Noted: 2019-06-04

## 2019-06-04 PROCEDURE — 99999 PR PBB SHADOW E&M-EST. PATIENT-LVL IV: CPT | Mod: PBBFAC,,, | Performed by: NURSE PRACTITIONER

## 2019-06-04 PROCEDURE — 99999 PR PBB SHADOW E&M-EST. PATIENT-LVL IV: ICD-10-PCS | Mod: PBBFAC,,, | Performed by: NURSE PRACTITIONER

## 2019-06-04 PROCEDURE — 99244 PR OFFICE CONSULTATION,LEVEL IV: ICD-10-PCS | Mod: S$GLB,,, | Performed by: NURSE PRACTITIONER

## 2019-06-04 PROCEDURE — 99244 OFF/OP CNSLTJ NEW/EST MOD 40: CPT | Mod: S$GLB,,, | Performed by: NURSE PRACTITIONER

## 2019-06-04 RX ORDER — LEVOTHYROXINE SODIUM 100 UG/1
100 TABLET ORAL DAILY
Qty: 30 TABLET | Refills: 11 | Status: SHIPPED | OUTPATIENT
Start: 2019-06-04 | End: 2019-08-01 | Stop reason: SDUPTHER

## 2019-06-04 NOTE — ASSESSMENT & PLAN NOTE
-- pt very upset and crying throughout visit. She voiced that she is very concerned that she is experiencing all of her symptoms due to her thyroid levels  -- I reviewed that symptoms of hypothyroidism may not correlate directly with TSH  -- Pt states she is followed by her psychologist   -- Offered to refer to sleep medicine to address issue of insomnia; pt declined

## 2019-06-04 NOTE — ASSESSMENT & PLAN NOTE
-- Clinically hypothyroid and biochemically hypothyroid  -- Goal is a normal TSH  -- Increase levothyroxine 100 mcg once daily; check tsh levels in 6 weeks   -- Avoid exogenous hyperthyroidism as this can accelerate bone loss and increase risk of CV complications.  -- Advised to take LT4 on an empty stomach with water and to wait 30-45 minutes before eating or taking other medications   -- Reviewed usual  times of thyroid hormone  changes- call if start/ stop ocps, weight changes, attempting conception and during pregnancy   -- Reviewed that symptoms of hypothyroidism may not correlate withTSH, and a normal TSH is the goal of therapy. Symptoms are not a justification for over treatment.  -- Pt seemed most concerned about symptoms of headache, vertigo, and fatigue. Referral placed to ENT to rule out inner ear disease; pt agreed

## 2019-06-07 ENCOUNTER — TELEPHONE (OUTPATIENT)
Dept: OTOLARYNGOLOGY | Facility: CLINIC | Age: 44
End: 2019-06-07

## 2019-06-10 ENCOUNTER — OFFICE VISIT (OUTPATIENT)
Dept: OTOLARYNGOLOGY | Facility: CLINIC | Age: 44
End: 2019-06-10
Payer: COMMERCIAL

## 2019-06-10 ENCOUNTER — CLINICAL SUPPORT (OUTPATIENT)
Dept: AUDIOLOGY | Facility: CLINIC | Age: 44
End: 2019-06-10
Payer: COMMERCIAL

## 2019-06-10 VITALS
WEIGHT: 274.5 LBS | DIASTOLIC BLOOD PRESSURE: 75 MMHG | BODY MASS INDEX: 40.53 KG/M2 | SYSTOLIC BLOOD PRESSURE: 117 MMHG | HEART RATE: 65 BPM

## 2019-06-10 DIAGNOSIS — H81.319 AUDITORY VERTIGO, UNSPECIFIED LATERALITY: Primary | ICD-10-CM

## 2019-06-10 DIAGNOSIS — R42 DIZZINESS: Primary | ICD-10-CM

## 2019-06-10 PROCEDURE — 3008F PR BODY MASS INDEX (BMI) DOCUMENTED: ICD-10-PCS | Mod: CPTII,S$GLB,, | Performed by: NURSE PRACTITIONER

## 2019-06-10 PROCEDURE — 92557 COMPREHENSIVE HEARING TEST: CPT | Mod: S$GLB,,, | Performed by: AUDIOLOGIST

## 2019-06-10 PROCEDURE — 92567 PR TYMPA2METRY: ICD-10-PCS | Mod: S$GLB,,, | Performed by: AUDIOLOGIST

## 2019-06-10 PROCEDURE — 92567 TYMPANOMETRY: CPT | Mod: S$GLB,,, | Performed by: AUDIOLOGIST

## 2019-06-10 PROCEDURE — 99999 PR PBB SHADOW E&M-EST. PATIENT-LVL I: ICD-10-PCS | Mod: PBBFAC,,,

## 2019-06-10 PROCEDURE — 99202 PR OFFICE/OUTPT VISIT, NEW, LEVL II, 15-29 MIN: ICD-10-PCS | Mod: S$GLB,,, | Performed by: NURSE PRACTITIONER

## 2019-06-10 PROCEDURE — 3008F BODY MASS INDEX DOCD: CPT | Mod: CPTII,S$GLB,, | Performed by: NURSE PRACTITIONER

## 2019-06-10 PROCEDURE — 99202 OFFICE O/P NEW SF 15 MIN: CPT | Mod: S$GLB,,, | Performed by: NURSE PRACTITIONER

## 2019-06-10 PROCEDURE — 99999 PR PBB SHADOW E&M-EST. PATIENT-LVL I: CPT | Mod: PBBFAC,,,

## 2019-06-10 PROCEDURE — 99999 PR PBB SHADOW E&M-EST. PATIENT-LVL III: ICD-10-PCS | Mod: PBBFAC,,, | Performed by: NURSE PRACTITIONER

## 2019-06-10 PROCEDURE — 92557 PR COMPREHENSIVE HEARING TEST: ICD-10-PCS | Mod: S$GLB,,, | Performed by: AUDIOLOGIST

## 2019-06-10 PROCEDURE — 99999 PR PBB SHADOW E&M-EST. PATIENT-LVL III: CPT | Mod: PBBFAC,,, | Performed by: NURSE PRACTITIONER

## 2019-06-10 NOTE — PROGRESS NOTES
Subjective:      Noemi Murillo is a 44 y.o. female who was referred to me by Chelo Shetty in consultation for dizziness.    Patient reports dizziness that occurs most days of the week for the past 6 months. She states she is on her feet often for work and sometimes has to carry heavy bags (50 lbs.). She denies at triggers to the dizziness. She has tried meclizine with some benefit. She has also tried laying down without benefit. She describes the dizziness as sometimes lightheaded, foggy, or spinning. She states when the dizziness occurs it last for about an hour. She denies any change in hearing, vision, balance, facial numbess or weakness, or tinnitus. She states the dizziness can occur at any time of the day.  There is not a family history of hearing loss at a young age.  There is not a prior history of ear surgery.  There is not a prior history of ear infections .  She denies a history of significant noise exposure.  She has a history of hypothyroidism but states it is well managed. She denies any change in mediations. She reports a history of migraine.    Past Medical History  She has a past medical history of Anxiety, Depression, and Insomnia.    Past Surgical History  She has a past surgical history that includes Tonsillectomy and Incision and drainage of wound (2012).    Family History  Her family history includes Cancer (age of onset: 72) in her paternal aunt; No Known Problems in her father, mother, and sister.    Social History  She reports that she quit smoking about 10 years ago. Her smoking use included cigarettes. She started smoking about 23 years ago. She smoked 0.25 packs per day. She has never used smokeless tobacco. She reports that she drinks about 1.2 - 1.8 oz of alcohol per week. She reports that she has current or past drug history. Drug: Marijuana.    Allergies  She has No Known Allergies.    Medications  She has a current medication list which includes the following  prescription(s): levothyroxine, meclizine, sertraline, sumatriptan, and trazodone.    Review of Systems   Constitutional: Negative for chills, fever and unexpected weight change.   HENT: Negative for congestion, ear discharge, ear pain, facial swelling, hearing loss, postnasal drip, sinus pressure, sore throat, tinnitus and trouble swallowing.    Eyes: Negative for pain and visual disturbance.   Respiratory: Negative for apnea and shortness of breath.    Cardiovascular: Negative for chest pain and palpitations.   Gastrointestinal: Negative for abdominal pain and nausea.   Endocrine: Negative for cold intolerance and heat intolerance.   Musculoskeletal: Negative for joint swelling and neck stiffness.   Skin: Negative for color change and rash.   Neurological: Positive for dizziness, light-headedness and headaches. Negative for tremors, seizures, syncope, facial asymmetry, speech difficulty, weakness and numbness.   Hematological: Negative for adenopathy. Does not bruise/bleed easily.   Psychiatric/Behavioral: Negative for agitation. The patient is not nervous/anxious.           Objective:     /75   Pulse 65   Wt 124.5 kg (274 lb 7.6 oz)   BMI 40.53 kg/m²      Constitutional:   She is oriented to person, place, and time. Vital signs are normal. She appears well-developed and well-nourished. She appears alert. Normal speech.      Head:  Normocephalic and atraumatic.     Ears:    Right Ear: No lacerations. No drainage, swelling or tenderness. No foreign bodies. No mastoid tenderness. Tympanic membrane is not injected, not scarred, not perforated, not erythematous, not retracted and not bulging. Tympanic membrane mobility is normal. No middle ear effusion. No hemotympanum. Decreased hearing is noted.   Left Ear: No lacerations. No drainage, swelling or tenderness. No foreign bodies. No mastoid tenderness. Tympanic membrane is not injected, not scarred, not perforated, not erythematous, not retracted and not  bulging. Tympanic membrane mobility is normal.  No middle ear effusion. No hemotympanum. Decreased hearing is noted.     Nose:  Nose normal including turbinates, nasal mucosa, sinuses and nasal septum.     Mouth/Throat  Lips, teeth, and gums normal and oropharynx normal.     Neck:  Neck normal without thyromegaly masses, asymmetry, normal tracheal structure, crepitus, and tenderness and no adenopathy.     Cardiovascular:   Normal pulses.      Pulmonary/Chest:   Effort normal.     Psychiatric:   She has a normal mood and affect. Her speech is normal and behavior is normal.     Neurological:   She is alert and oriented to person, place, and time. No cranial nerve deficit or sensory deficit.     Skin:   No abrasions, lacerations, lesions, or rashes.       Procedure    None      Data Reviewed    WBC (Thousand/uL)   Date Value   10/31/2018 7.6     Platelets (Thousand/uL)   Date Value   10/31/2018 256      Creatinine (mg/dL)   Date Value   10/31/2018 0.62     TSH (mIU/L)   Date Value   05/10/2019 2.76     Glucose (mg/dL)   Date Value   10/31/2018 87     No results found for: HGBA1C    I independently reviewed the tracings of the complete audiometric evaluation performed today.  I reviewed the audiogram with the patient as well.  Pertinent findings include a normal study.         Assessment:     1. Dizziness         Plan:     I had a long discussion with the patient regarding her condition and the further workup and management options.    Her symptoms of dizziness are nonspecific with no identifiable triggers.   Audiogram is normal.  I ordered a VNG for further evaluation.  I will follow up the the patient in 2 weeks with the VNG results.

## 2019-06-10 NOTE — LETTER
Glendy 10, 2019      Chelo Shetty NP  [address]  [facility] [address]           [facility]  [address]  [facility] [address]  Phone: [phone]  Fax: [phone]          Patient: Noemi Murillo   MR Number: [id number]   YOB: 1975   Date of Visit: 6/10/2019       Dear Chelo Shetty:    Thank you for referring Noemi Murillo to me for evaluation. Attached you will find relevant portions of my assessment and plan of care.    If you have questions, please do not hesitate to call me. I look forward to following Noemi Murillo along with you.    Sincerely,    Sandra Dawson NP    Enclosure  CC:  No Recipients    If you would like to receive this communication electronically, please contact [email or web address] or [phone] to request more information on [employer] access.    For providers and/or their staff who would like to refer a patient to [facility], please contact us through our one-stop-shop provider referral line, [facility], at [phone].    If you feel you have received this communication in error or would no longer like to receive these types of communications, please e-mail [email or web address]

## 2019-06-11 ENCOUNTER — PATIENT MESSAGE (OUTPATIENT)
Dept: OTOLARYNGOLOGY | Facility: CLINIC | Age: 44
End: 2019-06-11

## 2019-06-12 ENCOUNTER — TELEPHONE (OUTPATIENT)
Dept: ENDOCRINOLOGY | Facility: CLINIC | Age: 44
End: 2019-06-12

## 2019-06-12 NOTE — TELEPHONE ENCOUNTER
Spoke with patient. Patient states her TSH is on the low  Of normal and still having symtpoms. Explained to patient her medication was increased.  The standard of care is we treat off of thyroid labs and not symptoms.

## 2019-07-30 LAB
T4 FREE SERPL-MCNC: 1 NG/DL (ref 0.8–1.8)
TSH SERPL-ACNC: 2.12 MIU/L

## 2019-08-01 RX ORDER — LEVOTHYROXINE SODIUM 100 UG/1
100 TABLET ORAL DAILY
Qty: 90 TABLET | Refills: 3 | Status: SHIPPED | OUTPATIENT
Start: 2019-08-01 | End: 2020-12-02

## 2019-08-09 DIAGNOSIS — F41.9 ANXIETY AND DEPRESSION: Primary | ICD-10-CM

## 2019-08-09 DIAGNOSIS — F32.A ANXIETY AND DEPRESSION: Primary | ICD-10-CM

## 2019-08-09 RX ORDER — SERTRALINE HYDROCHLORIDE 100 MG/1
150 TABLET, FILM COATED ORAL DAILY
Qty: 135 TABLET | Refills: 3 | Status: SHIPPED | OUTPATIENT
Start: 2019-08-09 | End: 2019-08-22

## 2019-08-19 ENCOUNTER — PATIENT MESSAGE (OUTPATIENT)
Dept: INTERNAL MEDICINE | Facility: CLINIC | Age: 44
End: 2019-08-19

## 2019-08-21 ENCOUNTER — PATIENT MESSAGE (OUTPATIENT)
Dept: INTERNAL MEDICINE | Facility: CLINIC | Age: 44
End: 2019-08-21

## 2019-08-21 DIAGNOSIS — F32.A ANXIETY AND DEPRESSION: ICD-10-CM

## 2019-08-21 DIAGNOSIS — F41.9 ANXIETY AND DEPRESSION: ICD-10-CM

## 2019-08-22 RX ORDER — SERTRALINE HYDROCHLORIDE 100 MG/1
200 TABLET, FILM COATED ORAL DAILY
Qty: 180 TABLET | Refills: 0 | Status: SHIPPED | OUTPATIENT
Start: 2019-08-22 | End: 2020-12-02

## 2019-08-22 RX ORDER — HYDROXYZINE HYDROCHLORIDE 25 MG/1
25 TABLET, FILM COATED ORAL 3 TIMES DAILY PRN
Qty: 30 TABLET | Refills: 1 | Status: SHIPPED | OUTPATIENT
Start: 2019-08-22 | End: 2020-03-08

## 2019-08-22 NOTE — TELEPHONE ENCOUNTER
On chart check:   wellbutrin made anxiety worse in past  remeron caused brain fog  - will avoid these     Can increase zoloft to 200mg to see if anxiety better controlled with this dose.   Can consider hydroxyzine which is an anti-histamine given for anxiety and panic attacks.     If she would like to proceed with one or both then please let me know

## 2019-09-04 ENCOUNTER — OFFICE VISIT (OUTPATIENT)
Dept: URGENT CARE | Facility: CLINIC | Age: 44
End: 2019-09-04
Payer: OTHER MISCELLANEOUS

## 2019-09-04 ENCOUNTER — PATIENT MESSAGE (OUTPATIENT)
Dept: INTERNAL MEDICINE | Facility: CLINIC | Age: 44
End: 2019-09-04

## 2019-09-04 VITALS
RESPIRATION RATE: 18 BRPM | TEMPERATURE: 99 F | BODY MASS INDEX: 40.58 KG/M2 | OXYGEN SATURATION: 99 % | HEIGHT: 69 IN | DIASTOLIC BLOOD PRESSURE: 95 MMHG | SYSTOLIC BLOOD PRESSURE: 132 MMHG | HEART RATE: 92 BPM | WEIGHT: 274 LBS

## 2019-09-04 DIAGNOSIS — T14.90XA TRAUMA: ICD-10-CM

## 2019-09-04 DIAGNOSIS — S92.315A CLOSED NONDISPLACED FRACTURE OF FIRST METATARSAL BONE OF LEFT FOOT, INITIAL ENCOUNTER: Primary | ICD-10-CM

## 2019-09-04 DIAGNOSIS — R53.83 FATIGUE, UNSPECIFIED TYPE: ICD-10-CM

## 2019-09-04 DIAGNOSIS — E03.9 HYPOTHYROIDISM, UNSPECIFIED TYPE: Primary | ICD-10-CM

## 2019-09-04 DIAGNOSIS — E61.1 IRON DEFICIENCY: ICD-10-CM

## 2019-09-04 PROCEDURE — 73630 XR FOOT COMPLETE 3 VIEW LEFT: ICD-10-PCS | Mod: FY,LT,S$GLB, | Performed by: RADIOLOGY

## 2019-09-04 PROCEDURE — 73630 X-RAY EXAM OF FOOT: CPT | Mod: FY,LT,S$GLB, | Performed by: RADIOLOGY

## 2019-09-04 PROCEDURE — 99203 OFFICE O/P NEW LOW 30 MIN: CPT | Mod: S$GLB,,, | Performed by: FAMILY MEDICINE

## 2019-09-04 PROCEDURE — 99203 PR OFFICE/OUTPT VISIT, NEW, LEVL III, 30-44 MIN: ICD-10-PCS | Mod: S$GLB,,, | Performed by: FAMILY MEDICINE

## 2019-09-04 NOTE — PROGRESS NOTES
Subjective:       Patient ID: Noemi Murillo is a 44 y.o. female.    Chief Complaint: Foot Injury    Patient presents with left foot pain after tripping over something at work and falling awkwardly.  No previous trauma to this foot. Pain level is a 9    Foot Injury    The incident occurred less than 1 hour ago. There was no injury mechanism. The pain is present in the left foot. The quality of the pain is described as burning and aching. The pain is at a severity of 9/10. The pain is moderate. The pain has been constant since onset. Associated symptoms include an inability to bear weight and a loss of motion. She reports no foreign bodies present. The symptoms are aggravated by movement and weight bearing. She has tried ice for the symptoms. The treatment provided mild relief.     Review of Systems   Musculoskeletal: Positive for joint pain, joint swelling and muscle cramps.   All other systems reviewed and are negative.      Objective:      Physical Exam   Constitutional: She appears well-developed and well-nourished.   HENT:   Head: Normocephalic and atraumatic.   Eyes: Pupils are equal, round, and reactive to light.   Cardiovascular: Normal rate, regular rhythm and normal heart sounds.   Pulmonary/Chest: Effort normal and breath sounds normal. No stridor. No respiratory distress. She has no wheezes.   Musculoskeletal:   Point of maximal tenderness is at base of 1st metatarsal with tenderness to the surrounding area as well with associated swelling.  Nontender at ankle joint.  Peripheral pulses present and equal.   Skin: She is not diaphoretic.   Vitals reviewed.    XRAY: Slight cortical irregularity noted at the base of the 1st metatarsal bone laterally.  This could represent a  nondisplaced fracture or related to DJD.   Assessment:       1. Closed nondisplaced fracture of first metatarsal bone of left foot, initial encounter    2. Trauma        Plan:       KEEP ELEVATED AS OFTEN AS POSSIBLE.    IBUPROFEN 600 MG  EVERY 6 HR AS NEEDED.    FOLLOW-UP TOMORROW WITH OCCUPATIONAL HEALTH, AS OUTLINED ON THAT SEPARATE SHEET.              No follow-ups on file.

## 2019-09-04 NOTE — PATIENT INSTRUCTIONS
Foot Fracture  You have a broken bone (fracture) in your foot. This will cause pain, swelling, and often bruising. It will usually take about 4 to 8 weeks to heal. A foot fracture may be treated with a special shoe, splint, cast, or boot.  Home care  Follow these guidelines when caring for yourself at home:  · You may be given a splint, cast, shoe, or boot to keep the injured area from moving. Unless you were told otherwise, use crutches or a walker. Dont put weight on the injured foot until your health care provider says you can do so. (You can rent crutches and a walker at many pharmacies and surgical or orthopedic supply stores.) Dont put weight on a splint, or it will break.  · Keep your leg elevated to reduce pain and swelling. When sleeping, put a pillow under the injured leg. When sitting, support the injured leg so it is above your waist. This is very important during the first 2 days (48 hours).  · Put an ice pack on the injured area. Do this for 20 minutes every 1 to 2 hours the first day for pain relief. You can make an ice pack by wrapping a plastic bag of ice cubes in a thin towel. As the ice melts, be careful that the splint, cast, boot, or shoe doesnt get wet. You can place the ice pack directly over the splint or cast. Unless told otherwise, you can open the boot or shoe to apply the ice pack. Continue using the ice pack 3 to 4 times a day for the next 2 days. Then use the ice pack as needed to ease pain and swelling.  · Keep the splint, cast, boot, or shoe dry. When bathing, protect it with a large plastic bag, rubber-banded at the top end. If a fiberglass splint or cast or boot gets wet, you can dry it with a hair dryer. Unless told otherwise, you can take off the boot or shoe to bathe.  · You may use acetaminophen or ibuprofen to control pain, unless another pain medicine was prescribed. If you have chronic liver or kidney disease, talk with your healthcare provider before using these  medicines. Also talk with your provider if youve had a stomach ulcer or gastrointestinal bleeding.  · Dont put creams or objects under the cast if you have itching.  Follow-up care  Follow up with your healthcare provider, or as advised. This is to make sure the bone is healing the way it should. If you were given a splint, it may be changed to a cast or boot at your follow-up visit.  X-rays may be taken. You will be told of any new findings that may affect your care.  When to seek medical advice  Call your healthcare provider right away if any of these occur:  · The cast or splint cracks  · The plaster cast or splint becomes wet or soft  · The fiberglass cast or splint stays wet for more than 24 hours  · Bad odor from the cast or wound fluid stains the cast  · Tightness or pain under the cast or splint gets worse  · Toes become swollen, cold, blue, numb, or tingly  · You cant move your toes  · Skin around cast or splint becomes red  · Fever of 100.4ºF (38ºC) or higher, or as directed by your healthcare provider  Date Last Reviewed: 2/1/2017  © 3183-9543 [employer]. [address] [place] [address], [place] [address]. All rights reserved. This information is not intended as a substitute for professional medical care. Always follow your healthcare professional's instructions.      KEEP ELEVATED AS OFTEN AS POSSIBLE.    IBUPROFEN 600 MG EVERY 6 HR AS NEEDED.    FOLLOW-UP TOMORROW WITH OCCUPATIONAL HEALTH, AS OUTLINED ON THAT SEPARATE SHEET.

## 2019-09-05 ENCOUNTER — OFFICE VISIT (OUTPATIENT)
Dept: URGENT CARE | Facility: CLINIC | Age: 44
End: 2019-09-05
Payer: OTHER MISCELLANEOUS

## 2019-09-05 VITALS
SYSTOLIC BLOOD PRESSURE: 135 MMHG | BODY MASS INDEX: 40.81 KG/M2 | HEART RATE: 67 BPM | WEIGHT: 275.56 LBS | DIASTOLIC BLOOD PRESSURE: 83 MMHG | OXYGEN SATURATION: 98 % | TEMPERATURE: 98 F | HEIGHT: 69 IN | RESPIRATION RATE: 20 BRPM
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
## 2019-09-05 DIAGNOSIS — T14.90XA TRAUMA: ICD-10-CM

## 2019-09-05 DIAGNOSIS — S92.315A CLOSED NONDISPLACED FRACTURE OF FIRST METATARSAL BONE OF LEFT FOOT, INITIAL ENCOUNTER: ICD-10-CM

## 2019-09-05 PROCEDURE — 99203 OFFICE O/P NEW LOW 30 MIN: CPT | Mod: S$GLB,,, | Performed by: NURSE PRACTITIONER

## 2019-09-05 PROCEDURE — 99203 PR OFFICE/OUTPT VISIT, NEW, LEVL III, 30-44 MIN: ICD-10-PCS | Mod: S$GLB,,, | Performed by: NURSE PRACTITIONER

## 2019-09-05 RX ORDER — IBUPROFEN 600 MG/1
600 TABLET ORAL EVERY 6 HOURS PRN
Qty: 30 TABLET | Refills: 0 | Status: SHIPPED | OUTPATIENT
Start: 2019-09-05 | End: 2020-12-02

## 2019-09-05 RX ORDER — HYDROCODONE BITARTRATE AND ACETAMINOPHEN 7.5; 325 MG/1; MG/1
1 TABLET ORAL EVERY 6 HOURS PRN
Qty: 28 TABLET | Refills: 0 | Status: SHIPPED | OUTPATIENT
Start: 2019-09-05 | End: 2019-09-12

## 2019-09-05 NOTE — PROGRESS NOTES
Subjective:       Patient ID: Noemi Murillo is a 44 y.o. female.    Chief Complaint: Work Related Injury    Patient tripped over a box at work and fell yesterday and broke her left first metatarsal.  Patient was originally seen at urgent care on [address].  X-rays were performed.    Foot Injury    The incident occurred 12 to 24 hours ago. The incident occurred at work. The injury mechanism was a fall and an inversion injury. The pain is present in the left foot. The quality of the pain is described as aching. The pain is at a severity of 8/10. The pain is severe. The pain has been constant since onset. Associated symptoms include an inability to bear weight. She reports no foreign bodies present. The symptoms are aggravated by movement, weight bearing and palpation. She has tried non-weight bearing, NSAIDs, ice and immobilization for the symptoms. The treatment provided no relief.     Review of Systems   Constitution: Negative for chills and fever.   HENT: Negative for sore throat.    Eyes: Negative for blurred vision.   Cardiovascular: Negative for chest pain.   Respiratory: Negative for shortness of breath.    Skin: Positive for color change. Negative for rash.   Musculoskeletal: Positive for falls, joint pain and joint swelling. Negative for back pain.   Gastrointestinal: Negative for abdominal pain, diarrhea, nausea and vomiting.   Neurological: Negative for headaches.   Psychiatric/Behavioral: The patient is not nervous/anxious.    All other systems reviewed and are negative.      Objective:      Physical Exam   Constitutional: She is oriented to person, place, and time. Vital signs are normal. She appears well-developed and well-nourished.  Non-toxic appearance. She does not appear ill. She appears distressed.   HENT:   Head: Normocephalic and atraumatic.   Right Ear: External ear normal.   Left Ear: External ear normal.   Nose: Nose normal.   Eyes: Pupils are equal, round, and reactive to light.   Neck: Neck  supple.   Cardiovascular: Normal rate and intact distal pulses.   Pulses:       Dorsalis pedis pulses are 2+ on the left side.        Posterior tibial pulses are 2+ on the left side.   Pulmonary/Chest: Effort normal. No stridor. No respiratory distress. She has no wheezes.   Musculoskeletal: She exhibits tenderness.        Left ankle: Normal.        Left foot: There is decreased range of motion, tenderness, bony tenderness and swelling.        Feet:    Point of maximal tenderness is at base of 1st metatarsal with tenderness to the surrounding area as well with associated swelling.  Nontender at ankle joint.  Peripheral pulses present and equal.   Neurological: She is alert and oriented to person, place, and time.   Skin: Skin is warm and dry. Capillary refill takes less than 2 seconds. Bruising (dorsal aspect right foot) noted. She is not diaphoretic.   Psychiatric: She has a normal mood and affect. Her behavior is normal.   Vitals reviewed.      Assessment:       1. Work related injury    2. Closed nondisplaced fracture of first metatarsal bone of left foot, initial encounter    3. Trauma        Plan:         Medications Ordered This Encounter   Medications    HYDROcodone-acetaminophen (NORCO) 7.5-325 mg per tablet     Sig: Take 1 tablet by mouth every 6 (six) hours as needed for Pain.     Dispense:  28 tablet     Refill:  0    ibuprofen (ADVIL,MOTRIN) 600 MG tablet     Sig: Take 1 tablet (600 mg total) by mouth every 6 (six) hours as needed.     Dispense:  30 tablet     Refill:  0     Patient Instructions: Attention not to aggravate affected area, Apply ice 24-48 hours then apply heat/warm soaks, Elevated affected area, Use splint as directed(You may take medications as directed for pain and discomfort)   Restrictions: Sit or stand as needed, Avoid frequent bending/lifting/twisting, Avoid climbing/kneeling/squatting, No lifting/pushing/pulling more than 10 lbs, No Prolonged standing/walking, No driving company  vehicles, No above the shoulder/overhead work(Patient may only work a maximum of 8 hr a day; light duty; 09/05/2019)  Follow up in about 2 weeks (around 9/19/2019).

## 2019-09-05 NOTE — LETTER
[facility] [address] [address]  Phone: [phone]  Fax: [phone]  [facility]: [id number]    Pt Name: Noemi Murillo  Injury Date: 09/04/2019   Employee ID:  Date of First Treatment: 09/05/2019   Company: [employer]      Appointment Time: 08:45 AM Arrived: 9am   Provider: Silvina Bond NP Time Out:925am     Office Treatment:   1. Work related injury    2. Closed nondisplaced fracture of first metatarsal bone of left foot, initial encounter    3. Trauma      Medications Ordered This Encounter   Medications    HYDROcodone-acetaminophen (NORCO) 7.5-325 mg per tablet    ibuprofen (ADVIL,MOTRIN) 600 MG tablet      Patient Instructions: Attention not to aggravate affected area, Apply ice 24-48 hours then apply heat/warm soaks, Elevated affected area, Use splint as directed(You may take medications as directed for pain and discomfort)    Restrictions: Sit or stand as needed, Avoid frequent bending/lifting/twisting, Avoid climbing/kneeling/squatting, No lifting/pushing/pulling more than 10 lbs, No Prolonged standing/walking, No driving company vehicles, No above the shoulder/overhead work(Patient may only work a maximum of 8 hr a day; light duty; 09/05/2019)     Return Appointment: 09/19/19 at 830am

## 2019-09-05 NOTE — PROGRESS NOTES
"Subjective:       Patient ID: Noemi Murillo is a 44 y.o. female.    Vitals:  height is 5' 9" (1.753 m) and weight is 125 kg (275 lb 9.2 oz). Her temperature is 98.4 °F (36.9 °C). Her blood pressure is 135/83 and her pulse is 67. Her respiration is 20 and oxygen saturation is 98%.     Chief Complaint: Work Related Injury    HPI  <OUCOOHADULT>    Objective:      Physical Exam    Assessment:       No diagnosis found.    Plan:         There are no diagnoses linked to this encounter.     "

## 2019-09-19 ENCOUNTER — OFFICE VISIT (OUTPATIENT)
Dept: URGENT CARE | Facility: CLINIC | Age: 44
End: 2019-09-19
Payer: OTHER MISCELLANEOUS

## 2019-09-19 VITALS
HEIGHT: 69 IN | RESPIRATION RATE: 20 BRPM | WEIGHT: 275.56 LBS | OXYGEN SATURATION: 97 % | DIASTOLIC BLOOD PRESSURE: 75 MMHG | BODY MASS INDEX: 40.81 KG/M2 | HEART RATE: 71 BPM | TEMPERATURE: 98 F | SYSTOLIC BLOOD PRESSURE: 124 MMHG

## 2019-09-19 DIAGNOSIS — T14.90XA TRAUMA: ICD-10-CM
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
## 2019-09-19 DIAGNOSIS — S92.315D CLOSED NONDISPLACED FRACTURE OF FIRST METATARSAL BONE OF LEFT FOOT WITH ROUTINE HEALING, SUBSEQUENT ENCOUNTER: ICD-10-CM

## 2019-09-19 PROCEDURE — 73630 XR FOOT COMPLETE 3 VIEW LEFT: ICD-10-PCS | Mod: TIER,LT,S$GLB, | Performed by: RADIOLOGY

## 2019-09-19 PROCEDURE — 99214 PR OFFICE/OUTPT VISIT, EST, LEVL IV, 30-39 MIN: ICD-10-PCS | Mod: S$GLB,,, | Performed by: NURSE PRACTITIONER

## 2019-09-19 PROCEDURE — 99214 OFFICE O/P EST MOD 30 MIN: CPT | Mod: S$GLB,,, | Performed by: NURSE PRACTITIONER

## 2019-09-19 PROCEDURE — 73630 X-RAY EXAM OF FOOT: CPT | Mod: TIER,LT,S$GLB, | Performed by: RADIOLOGY

## 2019-09-19 RX ORDER — TEMAZEPAM 30 MG/1
30 CAPSULE ORAL NIGHTLY PRN
Refills: 0 | COMMUNITY
Start: 2019-09-05

## 2019-09-19 RX ORDER — VENLAFAXINE HYDROCHLORIDE 150 MG/1
150 CAPSULE, EXTENDED RELEASE ORAL DAILY
Refills: 1 | COMMUNITY
Start: 2019-09-05 | End: 2020-03-08

## 2019-09-19 RX ORDER — DEXTROMETHORPHAN HYDROBROMIDE, GUAIFENESIN 5; 100 MG/5ML; MG/5ML
650 LIQUID ORAL EVERY 8 HOURS
Refills: 0 | COMMUNITY
Start: 2019-09-19 | End: 2020-12-02

## 2019-09-19 NOTE — PROGRESS NOTES
Subjective:       Patient ID: Noemi Murillo is a 44 y.o. female.    Chief Complaint: Work Related Injury    Patient states she feels like she is getting better. Only uses pain meds when she goes home at the end of the day.     Injury   This is a new problem. The current episode started 1 to 4 weeks ago. The problem occurs constantly. The problem has been unchanged. Associated symptoms include joint swelling. Pertinent negatives include no abdominal pain, chest pain, chills, fever, headaches, nausea, rash, sore throat or vomiting. The symptoms are aggravated by standing and walking. She has tried immobilization, NSAIDs and oral narcotics (prescribed meds. ) for the symptoms. The treatment provided moderate relief.     Review of Systems   Constitution: Negative for chills and fever.   HENT: Negative for sore throat.    Eyes: Negative for blurred vision.   Cardiovascular: Negative for chest pain.   Respiratory: Negative for shortness of breath.    Skin: Negative for rash.   Musculoskeletal: Positive for joint pain and joint swelling. Negative for back pain.   Gastrointestinal: Negative for abdominal pain, diarrhea, nausea and vomiting.   Neurological: Negative for headaches.   Psychiatric/Behavioral: The patient is not nervous/anxious.    All other systems reviewed and are negative.      Objective:      Physical Exam   Constitutional: She is oriented to person, place, and time. Vital signs are normal. She appears well-developed and well-nourished.  Non-toxic appearance. She does not appear ill. No distress.   HENT:   Head: Normocephalic and atraumatic.   Right Ear: External ear normal.   Left Ear: External ear normal.   Nose: Nose normal.   Eyes: Pupils are equal, round, and reactive to light.   Neck: Neck supple.   Cardiovascular: Normal rate and intact distal pulses.   Pulses:       Dorsalis pedis pulses are 2+ on the left side.        Posterior tibial pulses are 2+ on the left side.   Pulmonary/Chest: Effort normal.  No stridor. No respiratory distress. She has no wheezes.   Musculoskeletal: She exhibits tenderness.        Left ankle: Normal.        Left foot: There is decreased range of motion, tenderness, bony tenderness and swelling.        Feet:    Point of maximal tenderness is at base of 1st metatarsal with tenderness to the surrounding area as well with associated swelling.  Nontender at ankle joint.  Peripheral pulses present and equal.   Neurological: She is alert and oriented to person, place, and time.   Skin: Skin is warm and dry. Capillary refill takes less than 2 seconds. Bruising (dorsal aspect right foot) noted. She is not diaphoretic.   Psychiatric: She has a normal mood and affect. Her behavior is normal.   Vitals reviewed.    X-ray Foot Complete 3 View Left    Result Date: 9/19/2019  EXAMINATION: XR FOOT COMPLETE 3 VIEW LEFT CLINICAL HISTORY: .  Civilian activity done for income or pay TECHNIQUE: AP, lateral and oblique views of the left foot were performed. COMPARISON: No 04/09/2019 ne FINDINGS: Healing or healed fracture at the base of the 1st metatarsal bone identified as before.  No acute fracture or dislocation.  No bone destruction identified     See above Electronically signed by: Evan King MD Date:    09/19/2019 Time:    10:12    Xr Foot Complete 3 View Left    Result Date: 9/4/2019  EXAMINATION: XR FOOT COMPLETE 3 VIEW LEFT CLINICAL HISTORY: .  Injury, unspecified, initial encounter TECHNIQUE: AP, lateral and oblique views of the left foot were performed. COMPARISON: None FINDINGS: Slight cortical irregularity noted at the base of the 1st metatarsal bone laterally.  This could represent a  nondisplaced fracture or related to DJD.  Clinical correlation is necessary.  No other definite fractures or dislocation.  No bone destruction identified     See above Electronically signed by: Evan King MD Date:    09/04/2019 Time:    11:35    Assessment:       1. Work related injury    2. Closed nondisplaced  fracture of first metatarsal bone of left foot with routine healing, subsequent encounter    3. Trauma        Plan:         Medications Ordered This Encounter   Medications    acetaminophen (TYLENOL) 650 MG TbSR     Sig: Take 1 tablet (650 mg total) by mouth every 8 (eight) hours.     Refill:  0     Patient Instructions: Attention not to aggravate affected area, Apply ice 24-48 hours then apply heat/warm soaks, Elevated affected area, Use splint as directed(you may take your medications as directed for pain and discomfort)   Restrictions: Sit or stand as needed, Avoid frequent bending/lifting/twisting, Avoid climbing/kneeling/squatting, No lifting/pushing/pulling more than 10 lbs, No Prolonged standing/walking, No driving company vehicles, No above the shoulder/overhead work(Patient may only work a maximum of 8 hr a day; light duty; 09/19/2019)  Follow up in about 2 weeks (around 10/3/2019).

## 2019-09-19 NOTE — LETTER
[facility] [address] [address]  Phone: [phone]  Fax: [phone]  [facility]: [id number]    Pt Name: Noemi Murillo  Injury Date: 09/04/2019   Employee ID: [id number] Date of Treatment: 09/19/2019   Company: [employer]      Appointment Time: 08:15 AM Arrived: 9:00 am    Provider: Silvina Bond NP Time Out: 10:10 am      Office Treatment:   1. Work related injury    2. Closed nondisplaced fracture of first metatarsal bone of left foot with routine healing, subsequent encounter    3. Trauma      Medications Ordered This Encounter   Medications    acetaminophen (TYLENOL) 650 MG TbSR      Patient Instructions: Attention not to aggravate affected area, Apply ice 24-48 hours then apply heat/warm soaks, Elevated affected area, Use splint as directed(you may take your medications as directed for pain and discomfort)    Restrictions: Sit or stand as needed, Avoid frequent bending/lifting/twisting, Avoid climbing/kneeling/squatting, No lifting/pushing/pulling more than 10 lbs, No Prolonged standing/walking, No driving company vehicles, No above the shoulder/overhead work(Patient may only work a maximum of 8 hr a day; light duty; 09/19/2019)     Return Appointment: 10/3/2019 at 9:00 am

## 2019-09-19 NOTE — PROGRESS NOTES
"Subjective:       Patient ID: Noemi Murillo is a 44 y.o. female.    Vitals:  height is 5' 9" (1.753 m) and weight is 125 kg (275 lb 9.2 oz). Her temperature is 98.4 °F (36.9 °C). Her blood pressure is 124/75 and her pulse is 71. Her respiration is 20 and oxygen saturation is 97%.     Chief Complaint: Work Related Injury    HPI  <OUCOOHADULT>    Objective:      Physical Exam    Assessment:       No diagnosis found.    Plan:         There are no diagnoses linked to this encounter.     "

## 2019-09-19 NOTE — PROGRESS NOTES
"Subjective:       Patient ID: Noemi Murillo is a 44 y.o. female.    Vitals:  height is 5' 9" (1.753 m) and weight is 125 kg (275 lb 9.2 oz). Her temperature is 98.4 °F (36.9 °C). Her blood pressure is 124/75 and her pulse is 71. Her respiration is 20 and oxygen saturation is 97%.     Chief Complaint: Work Related Injury    HPI    Constitution: Negative for chills, fatigue and fever.   HENT: Negative for congestion and sore throat.    Neck: Negative for painful lymph nodes.   Cardiovascular: Negative for chest pain and leg swelling.   Eyes: Negative for double vision and blurred vision.   Respiratory: Negative for cough and shortness of breath.    Gastrointestinal: Negative for nausea, vomiting and diarrhea.   Genitourinary: Negative for dysuria, frequency, urgency and history of kidney stones.   Musculoskeletal: Negative for joint pain, joint swelling, muscle cramps and muscle ache.   Skin: Negative for color change, pale, rash and bruising.   Allergic/Immunologic: Negative for seasonal allergies.   Neurological: Negative for dizziness, history of vertigo, light-headedness, passing out and headaches.   Hematologic/Lymphatic: Negative for swollen lymph nodes.   Psychiatric/Behavioral: Negative for nervous/anxious, sleep disturbance and depression. The patient is not nervous/anxious.        Objective:      Physical Exam    Assessment:       No diagnosis found.    Plan:         There are no diagnoses linked to this encounter.     "

## 2019-09-19 NOTE — LETTER
[facility] [address] [address]  Phone: [phone]  Fax: [phone]  [facility]: [id number]    Pt Name: Noemi Murillo  Injury Date: 09/04/2019   Employee ID:  Date of First Treatment: 09/19/2019   Company: [employer]      Appointment Time: 08:15 AM Arrived: 9am   Provider: Silvina Bond NP Time Out:1010am     Office Treatment:   1. Work related injury    2. Closed nondisplaced fracture of first metatarsal bone of left foot with routine healing, subsequent encounter    3. Trauma      Medications Ordered This Encounter   Medications    acetaminophen (TYLENOL) 650 MG TbSR      Patient Instructions: Attention not to aggravate affected area, Apply ice 24-48 hours then apply heat/warm soaks, Elevated affected area, Use splint as directed(you may take your medications as directed for pain and discomfort)    Restrictions: Sit or stand as needed, Avoid frequent bending/lifting/twisting, Avoid climbing/kneeling/squatting, No lifting/pushing/pulling more than 10 lbs, No Prolonged standing/walking, No driving company vehicles, No above the shoulder/overhead work(Patient may only work a maximum of 8 hr a day; light duty; 09/19/2019)     Return Appointment: 10/03/19 at 9am

## 2019-10-03 ENCOUNTER — TELEPHONE (OUTPATIENT)
Dept: INTERNAL MEDICINE | Facility: CLINIC | Age: 44
End: 2019-10-03

## 2019-10-03 ENCOUNTER — OFFICE VISIT (OUTPATIENT)
Dept: URGENT CARE | Facility: CLINIC | Age: 44
End: 2019-10-03
Payer: OTHER MISCELLANEOUS

## 2019-10-03 VITALS
WEIGHT: 275.56 LBS | RESPIRATION RATE: 20 BRPM | TEMPERATURE: 98 F | BODY MASS INDEX: 40.81 KG/M2 | DIASTOLIC BLOOD PRESSURE: 85 MMHG | HEIGHT: 69 IN | OXYGEN SATURATION: 100 % | HEART RATE: 80 BPM | SYSTOLIC BLOOD PRESSURE: 142 MMHG

## 2019-10-03 DIAGNOSIS — E03.9 HYPOTHYROIDISM, UNSPECIFIED TYPE: Primary | ICD-10-CM

## 2019-10-03 DIAGNOSIS — T14.90XA TRAUMA: ICD-10-CM
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
## 2019-10-03 DIAGNOSIS — S92.315D CLOSED NONDISPLACED FRACTURE OF FIRST METATARSAL BONE OF LEFT FOOT WITH ROUTINE HEALING, SUBSEQUENT ENCOUNTER: ICD-10-CM

## 2019-10-03 PROCEDURE — 99214 PR OFFICE/OUTPT VISIT, EST, LEVL IV, 30-39 MIN: ICD-10-PCS | Mod: S$GLB,,, | Performed by: NURSE PRACTITIONER

## 2019-10-03 PROCEDURE — 99214 OFFICE O/P EST MOD 30 MIN: CPT | Mod: S$GLB,,, | Performed by: NURSE PRACTITIONER

## 2019-10-03 NOTE — TELEPHONE ENCOUNTER
Spoke w/ pt and she confirmed that  is an Endocrinologist someone referred her to him to help manage her hyperthyroid b/c she had an bad experience w/ the endo provider thru [facility] that she saw.     office is requesting an referral along w/ LCV notes.  Pt confirmed that its okay to release any rec over that are needed .

## 2019-10-03 NOTE — TELEPHONE ENCOUNTER
----- Message from Rivka Velasquez sent at 10/3/2019 12:25 PM CDT -----  Contact: Agustina from Dr. Garcia's office  Name of Who is Calling: Agustina from Dr. Garcia's office    What is the request in detail: Agustina from Dr. Garcia's office is requesting clinic notes last office visit and referrals faxed to [phone]...Please contact to further discuss and advise      Can the clinic reply by [facility]:     [facility]: [phone]

## 2019-10-03 NOTE — PROGRESS NOTES
Subjective:       Patient ID: Noemi Murillo is a 44 y.o. female.    Chief Complaint: Work Related Injury (Left foot)    Patient here today for a follow up visit on Left foot injury. Patient states the pain is more than 50% better.    Foot Injury    The incident occurred more than 1 week ago. The incident occurred at work. The pain is present in the left foot. The pain is at a severity of 0/10. The patient is experiencing no pain. The pain has been intermittent since onset. Pertinent negatives include no inability to bear weight, loss of motion, loss of sensation, muscle weakness, numbness or tingling. She reports no foreign bodies present. Nothing aggravates the symptoms. She has tried immobilization for the symptoms. The treatment provided significant relief.     Review of Systems   Constitution: Negative for chills, decreased appetite and diaphoresis.   HENT: Negative for congestion, ear discharge and ear pain.    Eyes: Negative for blurred vision, discharge and double vision.   Cardiovascular: Negative for chest pain, claudication, cyanosis, dyspnea on exertion and irregular heartbeat.   Respiratory: Negative for cough, hemoptysis and shortness of breath.    Endocrine: Negative for cold intolerance, heat intolerance and polydipsia.   Skin: Negative for color change, dry skin and nail changes.   Musculoskeletal: Positive for joint pain.   Gastrointestinal: Negative for bloating, abdominal pain, anorexia, change in bowel habit, bowel incontinence, constipation, diarrhea, dysphagia and excessive appetite.   Genitourinary: Negative for bladder incontinence, decreased libido and dysuria.   Neurological: Negative for aphonia, brief paralysis, difficulty with concentration, excessive daytime sleepiness, numbness and tingling.   All other systems reviewed and are negative.      Objective:      Physical Exam   Constitutional: She is oriented to person, place, and time. Vital signs are normal. She appears well-developed  and well-nourished.  Non-toxic appearance. She does not appear ill. No distress.   HENT:   Head: Normocephalic and atraumatic.   Right Ear: External ear normal.   Left Ear: External ear normal.   Nose: Nose normal.   Eyes: Pupils are equal, round, and reactive to light.   Neck: Neck supple.   Cardiovascular: Normal rate and intact distal pulses.   Pulses:       Dorsalis pedis pulses are 2+ on the left side.        Posterior tibial pulses are 2+ on the left side.   Pulmonary/Chest: Effort normal. No stridor. No respiratory distress. She has no wheezes.   Musculoskeletal: She exhibits no tenderness.        Left ankle: Normal.        Left foot: There is normal range of motion, no bony tenderness and no swelling.        Feet:    Walking boot removed, pt to wear a hard soled shoe.   Neurological: She is alert and oriented to person, place, and time.   Skin: Skin is warm and dry. Capillary refill takes less than 2 seconds. Bruising (dorsal aspect right foot) noted. She is not diaphoretic.   Psychiatric: She has a normal mood and affect. Her behavior is normal.   Vitals reviewed.      Assessment:       1. Work related injury    2. Closed nondisplaced fracture of first metatarsal bone of left foot with routine healing, subsequent encounter    3. Trauma        Plan:            Patient Instructions: Attention not to aggravate affected area, Elevated affected area(Please wear hard-soled shoes instead of your boot.)   Restrictions: Sit or stand as needed, Avoid frequent bending/lifting/twisting, No Prolonged standing/walking(10/03/19)  Follow up in about 2 weeks (around 10/17/2019).

## 2019-10-03 NOTE — LETTER
[facility] [address] [address]  Phone: [phone]  Fax: [phone]  [facility]: [id number]    Pt Name: Noemi Murillo  Injury Date: 09/04/2019   Employee ID:  Date of First Treatment: 10/03/2019   Company: [employer]      Appointment Time: 01:15 PM Arrived: 130pm   Provider: Silvina Bond NP Time Out:158pm     Office Treatment:   1. Work related injury    2. Closed nondisplaced fracture of first metatarsal bone of left foot with routine healing, subsequent encounter    3. Trauma          Patient Instructions: Attention not to aggravate affected area, Elevated affected area(Please wear hard-soled shoes instead of your boot.)    Restrictions: Sit or stand as needed, Avoid frequent bending/lifting/twisting, No Prolonged standing/walking(10/03/19)     Return Appointment: 10/17/19 at 1pm

## 2019-10-03 NOTE — TELEPHONE ENCOUNTER
----- Message from Rivka Velasquez sent at 10/3/2019 12:25 PM CDT -----  Contact: Agustina from Dr. Garcia's office  Name of Who is Calling: Agustina from Dr. Garcia's office    What is the request in detail: Agustina from Dr. Garcia's office is requesting clinic notes last office visit and referrals faxed to [phone]...Please contact to further discuss and advise      Can the clinic reply by [facility]:     [facility]: [phone]

## 2019-10-03 NOTE — TELEPHONE ENCOUNTER
Called pt and LVM stating to return office call to confirm if its okay to release LCV notes to  office .

## 2019-10-17 ENCOUNTER — OFFICE VISIT (OUTPATIENT)
Dept: URGENT CARE | Facility: CLINIC | Age: 44
End: 2019-10-17
Payer: OTHER MISCELLANEOUS

## 2019-10-17 VITALS
HEIGHT: 69 IN | WEIGHT: 275.56 LBS | SYSTOLIC BLOOD PRESSURE: 134 MMHG | BODY MASS INDEX: 40.81 KG/M2 | RESPIRATION RATE: 20 BRPM | DIASTOLIC BLOOD PRESSURE: 92 MMHG | HEART RATE: 70 BPM | TEMPERATURE: 99 F | OXYGEN SATURATION: 100 %
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
## 2019-10-17 DIAGNOSIS — S92.315D CLOSED NONDISPLACED FRACTURE OF FIRST METATARSAL BONE OF LEFT FOOT WITH ROUTINE HEALING, SUBSEQUENT ENCOUNTER: ICD-10-CM

## 2019-10-17 PROCEDURE — 99214 PR OFFICE/OUTPT VISIT, EST, LEVL IV, 30-39 MIN: ICD-10-PCS | Mod: S$GLB,,, | Performed by: NURSE PRACTITIONER

## 2019-10-17 PROCEDURE — 99214 OFFICE O/P EST MOD 30 MIN: CPT | Mod: S$GLB,,, | Performed by: NURSE PRACTITIONER

## 2019-10-17 PROCEDURE — 73630 XR FOOT COMPLETE 3 VIEW LEFT: ICD-10-PCS | Mod: TIER,LT,S$GLB, | Performed by: RADIOLOGY

## 2019-10-17 PROCEDURE — 73630 X-RAY EXAM OF FOOT: CPT | Mod: TIER,LT,S$GLB, | Performed by: RADIOLOGY

## 2019-10-17 RX ORDER — LORAZEPAM 1 MG/1
TABLET ORAL
COMMUNITY
Start: 2019-10-04 | End: 2020-12-02

## 2019-10-17 NOTE — LETTER
[facility] [address] [address]  Phone: [phone]  Fax: [phone]  [facility]: [email or web address]    Pt Name: Noemi Murillo  Injury Date: 09/04/2019   Employee ID:  Date of First Treatment: 10/17/2019   Company: [employer]      Appointment Time: 12:45 PM Arrived: 1pm   Provider: Silvina Bond NP Time Out:230pm     Office Treatment:   1. Work related injury    2. Closed nondisplaced fracture of first metatarsal bone of left foot with routine healing, subsequent encounter               Restrictions: Regular Duty(10/17/19)     Return Appointment: 10/24/19 at 930am

## 2019-10-17 NOTE — LETTER
[facility] [address] [address]  Phone: [phone]  Fax: [phone]  [facility]: [id number]    Pt Name: Noemi Murillo  Injury Date: 09/04/2019   Employee ID: [id number] Date of  Treatment: 10/18/2019   Company: [employer] to record [employer]      Appointment Time: 12:45 PM Arrived: 1:00pm   Provider: Silvina Bond NP Time Out: 2:30pm     Office Treatment:   1. Work related injury    2. Closed nondisplaced fracture of first metatarsal bone of left foot with routine healing, subsequent encounter               Restrictions: Regular Duty(10/17/19)     Return Appointment: 10/24/2019 at 9:30 am

## 2019-10-17 NOTE — PROGRESS NOTES
Subjective:       Patient ID: Noemi Murillo is a 44 y.o. female.    Chief Complaint: Work Related Injury    Patient states foot still hurts but less than before.     Foot Injury    The incident occurred more than 1 week ago. The incident occurred at work. The injury mechanism was a fall and a direct blow. The pain is present in the left foot. The quality of the pain is described as aching. The pain is at a severity of 4/10. The pain is moderate. The pain has been improving since onset. She reports no foreign bodies present. The symptoms are aggravated by weight bearing (when bent. ).     Review of Systems   Musculoskeletal: Positive for joint pain and joint swelling.   All other systems reviewed and are negative.      Objective:      Physical Exam   Constitutional: She is oriented to person, place, and time. Vital signs are normal. She appears well-developed and well-nourished.  Non-toxic appearance. She does not appear ill. No distress.   HENT:   Head: Normocephalic and atraumatic.   Right Ear: External ear normal.   Left Ear: External ear normal.   Nose: Nose normal.   Eyes: Pupils are equal, round, and reactive to light.   Neck: Neck supple.   Cardiovascular: Normal rate and intact distal pulses.   Pulses:       Dorsalis pedis pulses are 2+ on the left side.        Posterior tibial pulses are 2+ on the left side.   Pulmonary/Chest: Effort normal. No stridor. No respiratory distress. She has no wheezes.   Musculoskeletal: She exhibits no tenderness.        Left ankle: Normal.        Left foot: There is normal range of motion, no bony tenderness and no swelling.        Feet:    Pain with flexion of the toe  pt to wear a hard soled shoe.   Neurological: She is alert and oriented to person, place, and time.   Skin: Skin is warm and dry. Capillary refill takes less than 2 seconds. Bruising (dorsal aspect right foot) noted. She is not diaphoretic.   Psychiatric: She has a normal mood and affect. Her behavior is normal.    Vitals reviewed.    X-ray Foot Complete 3 View Left    Result Date: 10/17/2019  EXAMINATION: XR FOOT COMPLETE 3 VIEW LEFT CLINICAL HISTORY: .  Civilian activity done for income or pay TECHNIQUE: AP, lateral and oblique views of the left foot were performed. COMPARISON: Non 09/19/2019 e FINDINGS: Healing fracture at the base of the 1st metatarsal bone identified in a satisfactory position and alignment     See above Electronically signed by: Evan King MD Date:    10/17/2019 Time:    14:26      Assessment:       1. Work related injury    2. Closed nondisplaced fracture of first metatarsal bone of left foot with routine healing, subsequent encounter        Plan:                Restrictions: Regular Duty(10/17/19)  Follow up in about 1 week (around 10/24/2019).

## 2019-10-17 NOTE — PROGRESS NOTES
"Subjective:       Patient ID: Noemi Murillo is a 44 y.o. female.    Vitals:  height is 5' 9" (1.753 m) and weight is 125 kg (275 lb 9.2 oz). Her temperature is 99.1 °F (37.3 °C). Her blood pressure is 134/92 (abnormal) and her pulse is 70. Her respiration is 20 and oxygen saturation is 100%.     Chief Complaint: Work Related Injury    HPI  <OUCOOHADULT>    Objective:      Physical Exam      Assessment:       No diagnosis found.    Plan:         There are no diagnoses linked to this encounter.       "

## 2019-10-24 ENCOUNTER — OFFICE VISIT (OUTPATIENT)
Dept: URGENT CARE | Facility: CLINIC | Age: 44
End: 2019-10-24
Payer: OTHER MISCELLANEOUS

## 2019-10-24 VITALS
DIASTOLIC BLOOD PRESSURE: 79 MMHG | WEIGHT: 275 LBS | RESPIRATION RATE: 16 BRPM | SYSTOLIC BLOOD PRESSURE: 119 MMHG | BODY MASS INDEX: 40.73 KG/M2 | TEMPERATURE: 98 F | HEIGHT: 69 IN | HEART RATE: 84 BPM | OXYGEN SATURATION: 100 %

## 2019-10-24 DIAGNOSIS — S92.315D CLOSED NONDISPLACED FRACTURE OF FIRST METATARSAL BONE OF LEFT FOOT WITH ROUTINE HEALING, SUBSEQUENT ENCOUNTER: ICD-10-CM
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
## 2019-10-24 PROCEDURE — 99214 PR OFFICE/OUTPT VISIT, EST, LEVL IV, 30-39 MIN: ICD-10-PCS | Mod: S$GLB,,, | Performed by: NURSE PRACTITIONER

## 2019-10-24 PROCEDURE — 99214 OFFICE O/P EST MOD 30 MIN: CPT | Mod: S$GLB,,, | Performed by: NURSE PRACTITIONER

## 2019-10-24 NOTE — PROGRESS NOTES
Subjective:       Patient ID: Noemi Murillo is a 44 y.o. female.    Chief Complaint: Work Related Injury    Work related injury follow up for foot injury. States that it still hurts and is slightly swollen.     Foot Injury    The incident occurred more than 1 week ago. The incident occurred at work. The pain is present in the left foot. The quality of the pain is described as aching. The pain is at a severity of 2/10. The pain is mild. The pain has been fluctuating since onset. She reports no foreign bodies present. She has tried acetaminophen for the symptoms.     Review of Systems   Constitution: Negative for chills and fever.   HENT: Negative for sore throat.    Eyes: Negative for blurred vision.   Cardiovascular: Negative for chest pain.   Respiratory: Negative for shortness of breath.    Endocrine: Negative for cold intolerance.   Skin: Negative for rash.   Musculoskeletal: Positive for joint pain and joint swelling. Negative for back pain.   Gastrointestinal: Negative for abdominal pain, diarrhea, nausea and vomiting.   Neurological: Negative for headaches.   Psychiatric/Behavioral: The patient is not nervous/anxious.    All other systems reviewed and are negative.      Objective:      Physical Exam   Constitutional: She is oriented to person, place, and time. Vital signs are normal. She appears well-developed and well-nourished.  Non-toxic appearance. She does not appear ill. No distress.   HENT:   Head: Normocephalic and atraumatic.   Right Ear: External ear normal.   Left Ear: External ear normal.   Nose: Nose normal.   Eyes: Pupils are equal, round, and reactive to light.   Neck: Neck supple.   Cardiovascular: Normal rate and intact distal pulses.   Pulses:       Dorsalis pedis pulses are 2+ on the left side.        Posterior tibial pulses are 2+ on the left side.   Pulmonary/Chest: Effort normal. No stridor. No respiratory distress. She has no wheezes.   Musculoskeletal: She exhibits no tenderness.         Left ankle: Normal.        Left foot: There is normal range of motion, no bony tenderness and no swelling.        Feet:    Pain with flexion of the toe  pt to wear a hard soled shoe.   Neurological: She is alert and oriented to person, place, and time.   Skin: Skin is warm and dry. Capillary refill takes less than 2 seconds. Bruising (dorsal aspect right foot) noted. She is not diaphoretic.   Psychiatric: She has a normal mood and affect. Her behavior is normal.   Vitals reviewed.      Assessment:       1. Work related injury    2. Closed nondisplaced fracture of first metatarsal bone of left foot with routine healing, subsequent encounter        Plan:            Patient Instructions: Referral to specialist to be scheduled, once authorized   Restrictions: Regular Duty(10/24/19)  Follow up in about 2 weeks (around 11/7/2019).

## 2019-10-24 NOTE — LETTER
[facility] [address] [address]  Phone: [phone]  Fax: [phone]  [facility]: [id number]    Pt Name: Noemi Murillo  Injury Date: 09/04/2019   Employee ID:  Date of First Treatment: 10/24/2019   Company: [employer]      Appointment Time: 09:15 AM Arrived: 930am   Provider: Silvina Bond NP Time Out:955am     Office Treatment:   1. Work related injury    2. Closed nondisplaced fracture of first metatarsal bone of left foot with routine healing, subsequent encounter          Patient Instructions: Referral to specialist to be scheduled, once authorized    Restrictions: Regular Duty(10/24/19)     Return Appointment: 11/07/19 at 930am

## 2019-10-24 NOTE — LETTER
[facility] [address] [address]  Phone: [phone]  Fax: [phone]  [facility]: [id number]    Pt Name: Noemi Murillo  Injury Date: 09/04/2019   Employee ID: [id number] Date of  Treatment: 10/24/2019   Company: [employer]      Appointment Time: 09:15 AM Arrived: 9:30am   Provider: Silvina Bond NP Time Out:9:55am     Office Treatment:   1. Work related injury    2. Closed nondisplaced fracture of first metatarsal bone of left foot with routine healing, subsequent encounter          Patient Instructions: Referral to specialist to be scheduled, once authorized    Restrictions: Regular Duty(10/24/19)     Return Appointment: 11/7/2019 at 9:30am

## 2019-11-07 ENCOUNTER — OFFICE VISIT (OUTPATIENT)
Dept: URGENT CARE | Facility: CLINIC | Age: 44
End: 2019-11-07
Payer: OTHER MISCELLANEOUS

## 2019-11-07 VITALS
BODY MASS INDEX: 40.73 KG/M2 | RESPIRATION RATE: 16 BRPM | OXYGEN SATURATION: 98 % | WEIGHT: 275 LBS | HEIGHT: 69 IN | SYSTOLIC BLOOD PRESSURE: 126 MMHG | HEART RATE: 85 BPM | DIASTOLIC BLOOD PRESSURE: 79 MMHG | TEMPERATURE: 99 F

## 2019-11-07 DIAGNOSIS — S92.315D CLOSED NONDISPLACED FRACTURE OF FIRST METATARSAL BONE OF LEFT FOOT WITH ROUTINE HEALING, SUBSEQUENT ENCOUNTER: ICD-10-CM

## 2019-11-07 DIAGNOSIS — T14.90XA TRAUMA: ICD-10-CM
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
## 2019-11-07 PROCEDURE — 99213 PR OFFICE/OUTPT VISIT, EST, LEVL III, 20-29 MIN: ICD-10-PCS | Mod: S$GLB,,, | Performed by: NURSE PRACTITIONER

## 2019-11-07 PROCEDURE — 99213 OFFICE O/P EST LOW 20 MIN: CPT | Mod: S$GLB,,, | Performed by: NURSE PRACTITIONER

## 2019-11-07 NOTE — LETTER
[facility] [address] [address]  Phone: [phone]  Fax: [phone]  [facility]: [id number]    Pt Name: Noemi Murillo  Injury Date: 09/04/2019   Employee ID:  Date of First Treatment: 11/07/2019   Company: [employer]      Appointment Time: 09:15 AM Arrived: 900   Provider: ROBERT Prince Time Out:0956     Office Treatment:   1. Work related injury    2. Closed nondisplaced fracture of first metatarsal bone of left foot with routine healing, subsequent encounter    3. Trauma          Patient Instructions: Attention not to aggravate affected area, Elevated affected area, Referral to specialist to be scheduled, once authorized    Restrictions: Regular Duty     Return Appointment: 11/21/2019 at 0900

## 2019-11-07 NOTE — PROGRESS NOTES
Subjective:       Patient ID: Noemi Murillo is a 44 y.o. female.    Chief Complaint: Work Related Injury    Work related injury for left foot fracture . Feels much better but still with bruising and swelling - mild to dorsal aspect of foot over the 3-5 metatarsal area. Podiatry authorized but not scheduled.     Foot Injury    The incident occurred more than 1 week ago. The incident occurred at work. The pain is present in the left foot. The pain is at a severity of 0/10. The patient is experiencing no pain. She reports no foreign bodies present.     Review of Systems   Constitution: Negative. Negative for chills and fever.   HENT: Negative.  Negative for sore throat.    Eyes: Negative.  Negative for blurred vision.   Cardiovascular: Negative.  Negative for chest pain.   Respiratory: Negative.  Negative for shortness of breath.    Endocrine: Negative.    Hematologic/Lymphatic: Negative.    Skin: Negative.  Negative for rash.   Musculoskeletal: Positive for joint pain. Negative for back pain.   Gastrointestinal: Negative.  Negative for abdominal pain, diarrhea, nausea and vomiting.   Genitourinary: Negative.    Neurological: Negative.  Negative for headaches.   Psychiatric/Behavioral: Negative.  The patient is not nervous/anxious.        Objective:      Physical Exam   Constitutional: She is oriented to person, place, and time. Vital signs are normal. She appears well-developed and well-nourished.  Non-toxic appearance. She does not appear ill. No distress.   HENT:   Head: Normocephalic and atraumatic.   Right Ear: External ear normal.   Left Ear: External ear normal.   Nose: Nose normal.   Eyes: Pupils are equal, round, and reactive to light.   Neck: Neck supple.   Cardiovascular: Normal rate and intact distal pulses.   Pulses:       Dorsalis pedis pulses are 2+ on the left side.        Posterior tibial pulses are 2+ on the left side.   Pulmonary/Chest: Effort normal. No stridor. No respiratory distress. She has no  wheezes.   Musculoskeletal: She exhibits no tenderness.        Left ankle: Normal.        Left foot: There is normal range of motion, no bony tenderness and no swelling.        Feet:    Pain with flexion of the toe  pt to wear a hard soled shoe.   Neurological: She is alert and oriented to person, place, and time.   Skin: Skin is warm and dry. Capillary refill takes less than 2 seconds. Bruising (dorsal aspect right foot) noted. She is not diaphoretic.   Psychiatric: She has a normal mood and affect. Her behavior is normal.   Vitals reviewed.      Assessment:       1. Work related injury    2. Closed nondisplaced fracture of first metatarsal bone of left foot with routine healing, subsequent encounter    3. Trauma        Plan:       Referral authorized - Podiatry difficult to get appt.- patient improving- will see back in two weeks and possible final xray and see if still needs podiatry at that point.      Patient Instructions: Attention not to aggravate affected area, Elevated affected area, Referral to specialist to be scheduled, once authorized   Restrictions: Regular Duty  Follow up in about 2 weeks (around 11/21/2019).

## 2019-11-21 ENCOUNTER — OFFICE VISIT (OUTPATIENT)
Dept: URGENT CARE | Facility: CLINIC | Age: 44
End: 2019-11-21
Payer: OTHER MISCELLANEOUS

## 2019-11-21 VITALS
RESPIRATION RATE: 16 BRPM | OXYGEN SATURATION: 100 % | WEIGHT: 275 LBS | HEART RATE: 76 BPM | DIASTOLIC BLOOD PRESSURE: 69 MMHG | TEMPERATURE: 98 F | HEIGHT: 69 IN | SYSTOLIC BLOOD PRESSURE: 112 MMHG | BODY MASS INDEX: 40.73 KG/M2

## 2019-11-21 DIAGNOSIS — S92.315D CLOSED NONDISPLACED FRACTURE OF FIRST METATARSAL BONE OF LEFT FOOT WITH ROUTINE HEALING, SUBSEQUENT ENCOUNTER: Primary | ICD-10-CM

## 2019-11-21 PROCEDURE — 99213 PR OFFICE/OUTPT VISIT, EST, LEVL III, 20-29 MIN: ICD-10-PCS | Mod: S$GLB,,, | Performed by: NURSE PRACTITIONER

## 2019-11-21 PROCEDURE — 99213 OFFICE O/P EST LOW 20 MIN: CPT | Mod: S$GLB,,, | Performed by: NURSE PRACTITIONER

## 2019-11-21 NOTE — PROGRESS NOTES
Subjective:       Patient ID: Noemi Murillo is a 44 y.o. female.    Chief Complaint: Work Related Injury    Patient is here for a work related injury to her foot patient states the pain is minimal now and she did receive a call about podiatry appt but feels it is not needed at this time.     Review of Systems   Constitution: Negative. Negative for chills and fever.   HENT: Negative.  Negative for sore throat.    Eyes: Negative.  Negative for blurred vision.   Cardiovascular: Negative.  Negative for chest pain.   Respiratory: Negative.  Negative for shortness of breath.    Endocrine: Negative.    Hematologic/Lymphatic: Negative.    Skin: Negative.  Negative for rash.   Musculoskeletal: Negative for back pain and joint pain.   Gastrointestinal: Negative.  Negative for abdominal pain, diarrhea, nausea and vomiting.   Genitourinary: Negative.    Neurological: Negative.  Negative for headaches.   Psychiatric/Behavioral: Negative.  The patient is not nervous/anxious.    All other systems reviewed and are negative.      Objective:      Physical Exam   Constitutional: She is oriented to person, place, and time. Vital signs are normal. She appears well-developed and well-nourished.  Non-toxic appearance. She does not appear ill. No distress.   HENT:   Head: Normocephalic and atraumatic.   Right Ear: External ear normal.   Left Ear: External ear normal.   Nose: Nose normal.   Eyes: Pupils are equal, round, and reactive to light.   Neck: Neck supple.   Cardiovascular: Normal rate and intact distal pulses.   Pulses:       Dorsalis pedis pulses are 2+ on the left side.        Posterior tibial pulses are 2+ on the left side.   Pulmonary/Chest: Effort normal. No stridor. No respiratory distress. She has no wheezes.   Musculoskeletal: She exhibits no tenderness.        Left ankle: Normal.        Left foot: There is normal range of motion, no bony tenderness and no swelling.        Feet:    Pain with flexion of the toe  pt to wear a  hard soled shoe.   Neurological: She is alert and oriented to person, place, and time.   Skin: Skin is warm and dry. Capillary refill takes less than 2 seconds. Bruising (dorsal aspect right foot) noted. She is not diaphoretic.   Psychiatric: She has a normal mood and affect. Her behavior is normal.   Vitals reviewed.      Assessment:       1. Closed nondisplaced fracture of first metatarsal bone of left foot with routine healing, subsequent encounter        Plan:            Patient Instructions: Attention not to aggravate affected area   Restrictions: Regular Duty, Discharged from Occupational Health  Follow up if symptoms worsen or fail to improve.

## 2019-11-21 NOTE — LETTER
[facility] [address] [address]  Phone: [phone]  Fax: [phone]  [facility]: [id number]    Pt Name: Noemi Murillo  Injury Date: 09/04/2019   Employee ID: [id number] Date of Treatment: 11/21/2019   Company: [employer]      Appointment Time: 08:45 AM Arrived: 0845   Provider: ROBERT Prince Time Out:0927     Office Treatment:   1. Closed nondisplaced fracture of first metatarsal bone of left foot with routine healing, subsequent encounter          Patient Instructions: Attention not to aggravate affected area    Restrictions: Regular Duty, Discharged from Occupational Health     Return Appointment: Visit date - discharged

## 2019-12-05 ENCOUNTER — TELEPHONE (OUTPATIENT)
Dept: URGENT CARE | Facility: CLINIC | Age: 44
End: 2019-12-05

## 2019-12-05 NOTE — TELEPHONE ENCOUNTER
Patient called back, states her foot is still sore, but she declined to follow up with ortho or any other provider at this time.  She was made aware, should she need treatment to call the [facility]

## 2019-12-13 DIAGNOSIS — Z12.39 BREAST CANCER SCREENING: ICD-10-CM

## 2020-03-08 ENCOUNTER — NURSE TRIAGE (OUTPATIENT)
Dept: ADMINISTRATIVE | Facility: CLINIC | Age: 45
End: 2020-03-08

## 2020-03-08 NOTE — TELEPHONE ENCOUNTER
Reason for Disposition   [1] SEVERE vomiting (e.g., 6 or more times/day) AND [2] present > 8 hours    Additional Information   Negative: Severe difficulty breathing (e.g., struggling for each breath, speaks in single words)   Negative: Bluish (or gray) lips or face now   Negative: Shock suspected (e.g., cold/pale/clammy skin, too weak to stand, low BP, rapid pulse)   Negative: Sounds like a life-threatening emergency to the triager   Negative: [1] Asthma attack (coughing, wheezing) is main concern AND [2] previously diagnosed with asthma OR using asthma medicines   Negative: [1] Sinus infection AND [2] taking an antibiotic   Negative: Taking antibiotics for an ear infection   Negative: Chest pain  (Exception: MILD central chest pain, present only when coughing)   Negative: Headache and stiff neck (can't touch chin to chest)   Negative: [1] Difficulty breathing AND [2] not severe AND [3] not from stuffy nose (e.g., not relieved by cleaning out the nose)   Negative: Fever > 104 F (40 C)   Negative: Patient sounds very sick or weak to the triager   Negative: Fever present > 3 days (72 hours)   Negative: [1] Fever returns after gone for over 24 hours AND [2] symptoms worse or not improved   Negative: [1] Using nasal washes and pain medicine > 24 hours AND [2] sinus pain (around cheekbone or eye) persists   Negative: Earache   Negative: [1] HIGH RISK (e.g., age > 64 years, pregnant, HIV+, or chronic medical condition) AND [2]  > 72 hours (3 days) since evaluated by HCP AND [3] symptoms not improved   Negative: [1] Taking antiviral medication AND [2] has question about the medication that triager can't answer   Negative: [1] Nasal discharge AND [2] present > 10 days   Negative: Cough present > 3 weeks   Negative: Shock suspected (e.g., cold/pale/clammy skin, too weak to stand, low BP, rapid pulse)   Negative: Difficult to awaken or acting confused (e.g., disoriented, slurred speech)   Negative:  "Sounds like a life-threatening emergency to the triager   Negative: Vomiting occurs only while coughing   Negative: [1] Pregnant < 20 Weeks AND [2] nausea/vomiting began in early pregnancy (i.e., 4-8 weeks pregnant)   Negative: Chest pain   Negative: Headache is main symptom   Negative: Vomiting (or Nausea) in a cancer patient who is currently (or recently) receiving chemotherapy or radiation therapy, or cancer patient who has metastatic or end-stage cancer and is receiving palliative care   Negative: [1] Vomiting AND [2] contains red blood or black ("coffee ground") material  (Exception: few red streaks in vomit that only happened once)   Negative: Severe pain in one eye   Negative: Recent head injury (within last 3 days)   Negative: Recent abdominal injury (within last 3 days)   Negative: [1] Insulin-dependent diabetes (Type I) AND [2] glucose > 400 mg/dl (22 mmol/l)    Protocols used: VOMITING-A-AH, INFLUENZA FOLLOW-UP CALL-A-AH    Pt stated she was diagnosed with Flu on Wednesday. Pt stated she feels worse and not getting better. Pt stated she has vomited 6 times today and feels very weak. Stated she cannot keep anything down since Thursaday. Pt sounds very sick. Per triage protocol advised to go to ED now and not to drive. Pt verbalized understanding.  "

## 2020-03-16 ENCOUNTER — DOCUMENTATION ONLY (OUTPATIENT)
Dept: PSYCHIATRY | Facility: HOSPITAL | Age: 45
End: 2020-03-16

## 2020-03-16 NOTE — PROGRESS NOTES
Sw placed a call to pt to provide benefit info in regards to BMU program. Pt voiced verbal understanding. Sw placed pt on list to contact for scheduling once informed by management to do so due to COVID-19

## 2020-10-05 ENCOUNTER — PATIENT MESSAGE (OUTPATIENT)
Dept: ADMINISTRATIVE | Facility: HOSPITAL | Age: 45
End: 2020-10-05

## 2020-10-07 ENCOUNTER — PATIENT MESSAGE (OUTPATIENT)
Dept: ADMINISTRATIVE | Facility: HOSPITAL | Age: 45
End: 2020-10-07

## 2020-12-02 ENCOUNTER — TELEPHONE (OUTPATIENT)
Dept: ADMINISTRATIVE | Facility: OTHER | Age: 45
End: 2020-12-02

## 2020-12-02 ENCOUNTER — PATIENT MESSAGE (OUTPATIENT)
Dept: INTERNAL MEDICINE | Facility: CLINIC | Age: 45
End: 2020-12-02

## 2020-12-02 ENCOUNTER — OFFICE VISIT (OUTPATIENT)
Dept: INTERNAL MEDICINE | Facility: CLINIC | Age: 45
End: 2020-12-02
Attending: INTERNAL MEDICINE
Payer: COMMERCIAL

## 2020-12-02 VITALS
DIASTOLIC BLOOD PRESSURE: 84 MMHG | BODY MASS INDEX: 42.74 KG/M2 | SYSTOLIC BLOOD PRESSURE: 122 MMHG | OXYGEN SATURATION: 97 % | WEIGHT: 288.56 LBS | HEIGHT: 69 IN | HEART RATE: 84 BPM

## 2020-12-02 DIAGNOSIS — R49.9 CHANGE IN VOICE: ICD-10-CM

## 2020-12-02 DIAGNOSIS — G43.109 MIGRAINE WITH AURA AND WITHOUT STATUS MIGRAINOSUS, NOT INTRACTABLE: ICD-10-CM

## 2020-12-02 DIAGNOSIS — Z00.00 ANNUAL PHYSICAL EXAM: Primary | ICD-10-CM

## 2020-12-02 DIAGNOSIS — E66.01 SEVERE OBESITY (BMI >= 40): ICD-10-CM

## 2020-12-02 DIAGNOSIS — Z12.31 ENCOUNTER FOR SCREENING MAMMOGRAM FOR MALIGNANT NEOPLASM OF BREAST: ICD-10-CM

## 2020-12-02 DIAGNOSIS — Z11.4 SCREENING FOR HIV (HUMAN IMMUNODEFICIENCY VIRUS): ICD-10-CM

## 2020-12-02 DIAGNOSIS — F32.A ANXIETY AND DEPRESSION: ICD-10-CM

## 2020-12-02 DIAGNOSIS — R51.9 INTRACTABLE EPISODIC HEADACHE, UNSPECIFIED HEADACHE TYPE: ICD-10-CM

## 2020-12-02 DIAGNOSIS — E03.9 HYPOTHYROIDISM, UNSPECIFIED TYPE: ICD-10-CM

## 2020-12-02 DIAGNOSIS — E04.1 THYROID NODULE: ICD-10-CM

## 2020-12-02 DIAGNOSIS — Z11.59 ENCOUNTER FOR HEPATITIS C SCREENING TEST FOR LOW RISK PATIENT: ICD-10-CM

## 2020-12-02 DIAGNOSIS — M62.838 MUSCLE SPASM: ICD-10-CM

## 2020-12-02 DIAGNOSIS — F41.9 ANXIETY AND DEPRESSION: ICD-10-CM

## 2020-12-02 DIAGNOSIS — R51.9 UNCONTROLLED MORNING HEADACHE: ICD-10-CM

## 2020-12-02 PROCEDURE — 99396 PREV VISIT EST AGE 40-64: CPT | Mod: S$GLB,,, | Performed by: INTERNAL MEDICINE

## 2020-12-02 PROCEDURE — 99396 PR PREVENTIVE VISIT,EST,40-64: ICD-10-PCS | Mod: S$GLB,,, | Performed by: INTERNAL MEDICINE

## 2020-12-02 PROCEDURE — 3008F PR BODY MASS INDEX (BMI) DOCUMENTED: ICD-10-PCS | Mod: CPTII,S$GLB,, | Performed by: INTERNAL MEDICINE

## 2020-12-02 PROCEDURE — 99999 PR PBB SHADOW E&M-EST. PATIENT-LVL V: ICD-10-PCS | Mod: PBBFAC,,, | Performed by: INTERNAL MEDICINE

## 2020-12-02 PROCEDURE — 1126F PR PAIN SEVERITY QUANTIFIED, NO PAIN PRESENT: ICD-10-PCS | Mod: S$GLB,,, | Performed by: INTERNAL MEDICINE

## 2020-12-02 PROCEDURE — 1126F AMNT PAIN NOTED NONE PRSNT: CPT | Mod: S$GLB,,, | Performed by: INTERNAL MEDICINE

## 2020-12-02 PROCEDURE — 99999 PR PBB SHADOW E&M-EST. PATIENT-LVL V: CPT | Mod: PBBFAC,,, | Performed by: INTERNAL MEDICINE

## 2020-12-02 PROCEDURE — 3008F BODY MASS INDEX DOCD: CPT | Mod: CPTII,S$GLB,, | Performed by: INTERNAL MEDICINE

## 2020-12-02 RX ORDER — SUMATRIPTAN SUCCINATE 100 MG/1
TABLET ORAL
Qty: 15 TABLET | Refills: 3 | Status: SHIPPED | OUTPATIENT
Start: 2020-12-02 | End: 2022-11-16

## 2020-12-02 RX ORDER — LITHIUM CARBONATE 300 MG
900 TABLET ORAL NIGHTLY
COMMUNITY
Start: 2020-02-04

## 2020-12-02 RX ORDER — PROMETHAZINE HYDROCHLORIDE 12.5 MG/1
12.5 TABLET ORAL EVERY 6 HOURS PRN
Qty: 30 TABLET | Refills: 1 | Status: SHIPPED | OUTPATIENT
Start: 2020-12-02 | End: 2022-11-16

## 2020-12-02 RX ORDER — FLUOXETINE HYDROCHLORIDE 40 MG/1
80 CAPSULE ORAL DAILY
COMMUNITY
Start: 2020-02-04

## 2020-12-02 RX ORDER — LEVOTHYROXINE SODIUM 150 MCG
150 TABLET ORAL DAILY
COMMUNITY
Start: 2020-11-27 | End: 2022-11-16

## 2020-12-02 NOTE — TELEPHONE ENCOUNTER
Left voice message for patient to return call to schedule appointment from referral to Sleep Medicine.  Sonya BAILEY [phone]

## 2020-12-02 NOTE — PROGRESS NOTES
"Subjective:   Patient ID: Noemi Murillo is a 45 y.o. female  Chief complaint:   Chief Complaint   Patient presents with    Annual Exam       HPI    Pt here for annual exam     Hypothyroidism, thyroid nodules:  - taking 150mcg Synthroid    - followed by endocrinology outside [facility] - Dr. Guzman at [facility]  - has appt with endosurg Friday to discuss thyroidectomy due to size of nodules    Migraines, Tension Headaches:  - occurring at left frontal ha and feels like stabbing at left eye   - reports has been waking with ha with severe ha and will get nausea and diarrhea when pain severe - 5-6 times per month over past 6 months which is new from piror HA hx  - will take zofran prn when sx cause nausea and vomiting - intermittently helpful   - imitrex 50mg can help but not when emesis as difficulty with keeping med down   - will sit in shower for 1 hour which at times can be helpful   - due for eye exam    Previously: tried   Taking magnesium oxide    Depression and anxiety:   Under TMS therapy  Taking fluoxetine 80, lithium 900mg   - taking restoril qhs prn insomnia     Taking vit d 5k    Has had thyroid labs and vit d by endocrine   Pt pulled [facility] and send full reports through portal     Reports having more mm spasams in thoracic back   Had xray at  a few years ago and told had arthritis - no numbness or weakness but pain worsening     Review of Systems      Objective:  Vitals:    12/02/20 0957   BP: 122/84   BP Location: Left arm   Patient Position: Sitting   Pulse: 84   SpO2: 97%   Weight: 130.9 kg (288 lb 9.3 oz)   Height: 5' 9" (1.753 m)     Body mass index is 42.62 kg/m².    Physical Exam  Vitals signs reviewed.   Constitutional:       Appearance: Normal appearance. She is well-developed.   HENT:      Head: Normocephalic and atraumatic.      Right Ear: Tympanic membrane, ear canal and external ear normal.      Left Ear: Tympanic membrane, ear canal and external ear normal.      Nose: Nose normal. " No congestion.      Mouth/Throat:      Mouth: Mucous membranes are moist.      Pharynx: Oropharynx is clear. No oropharyngeal exudate.   Eyes:      Extraocular Movements: Extraocular movements intact.      Conjunctiva/sclera: Conjunctivae normal.      Pupils: Pupils are equal, round, and reactive to light.   Neck:      Musculoskeletal: Normal range of motion and neck supple.      Thyroid: No thyromegaly.   Cardiovascular:      Rate and Rhythm: Normal rate and regular rhythm.      Pulses: Normal pulses.      Heart sounds: Normal heart sounds.   Pulmonary:      Effort: Pulmonary effort is normal.      Breath sounds: Normal breath sounds.   Abdominal:      General: Bowel sounds are normal.      Palpations: Abdomen is soft.   Musculoskeletal:         General: No swelling or tenderness.   Lymphadenopathy:      Cervical: No cervical adenopathy.   Skin:     General: Skin is warm and dry.      Capillary Refill: Capillary refill takes less than 2 seconds.      Nails: There is no clubbing.     Neurological:      General: No focal deficit present.      Mental Status: She is alert and oriented to person, place, and time. Mental status is at baseline.      Coordination: Coordination normal.      Gait: Gait normal.   Psychiatric:         Speech: Speech normal.         Behavior: Behavior normal.         Thought Content: Thought content normal.       Assessment:  1. Annual physical exam    2. Anxiety and depression    3. Hypothyroidism, unspecified type    4. Migraine with aura and without status migrainosus, not intractable    5. Uncontrolled morning headache    6. Intractable episodic headache, unspecified headache type    7. Encounter for screening mammogram for malignant neoplasm of breast    8. Screening for HIV (human immunodeficiency virus)    9. Encounter for hepatitis C screening test for low risk patient    10. Muscle spasm    11. Change in voice    12. Severe obesity (BMI >= 40)    13. Thyroid nodule         Plan:  Noemi was seen today for anxiety and hyperthyroidism.    Diagnoses and all orders for this visit:    Cont current medications  She will send copy of recent labs through [facility]   Imaging studies ordered     - cont diet and exercise  - increase intensity and duration of CV exercise to continue weight loss  - goal wt loss one pound per week  - portion control, healthy choices    Flu vaccine, tdap   Recommend daily sunscreen, cardiovascular exercise min 30 min 5 days per week. Seatbelts routinely.    mmg   Eye exam   F/u with specialists   MRI to evaluate new morning HA and inc intensity if sx   Sleep evaluation     Health Maintenance   Topic Date Due    Hepatitis C Screening  1975    Mammogram  03/31/2016    TETANUS VACCINE  09/08/2020    Lipid Panel  Completed

## 2020-12-04 ENCOUNTER — PATIENT MESSAGE (OUTPATIENT)
Dept: INTERNAL MEDICINE | Facility: CLINIC | Age: 45
End: 2020-12-04

## 2020-12-05 PROBLEM — E66.01 SEVERE OBESITY (BMI >= 40): Status: ACTIVE | Noted: 2020-12-05

## 2020-12-05 PROBLEM — M62.838 MUSCLE SPASM: Status: ACTIVE | Noted: 2020-12-05

## 2020-12-05 PROBLEM — R49.9 CHANGE IN VOICE: Status: ACTIVE | Noted: 2020-12-05

## 2020-12-05 PROBLEM — E04.1 THYROID NODULE: Status: ACTIVE | Noted: 2020-12-05

## 2020-12-05 PROBLEM — G43.109 MIGRAINE WITH AURA AND WITHOUT STATUS MIGRAINOSUS, NOT INTRACTABLE: Status: ACTIVE | Noted: 2020-12-05

## 2020-12-09 ENCOUNTER — HOSPITAL ENCOUNTER (OUTPATIENT)
Dept: RADIOLOGY | Facility: OTHER | Age: 45
Discharge: HOME | End: 2020-12-09
Attending: INTERNAL MEDICINE
Payer: COMMERCIAL

## 2020-12-09 DIAGNOSIS — Z12.31 ENCOUNTER FOR SCREENING MAMMOGRAM FOR MALIGNANT NEOPLASM OF BREAST: ICD-10-CM

## 2020-12-09 PROCEDURE — 77067 MAMMO DIGITAL SCREENING BILAT WITH TOMO: ICD-10-PCS | Mod: 26,,, | Performed by: RADIOLOGY

## 2020-12-09 PROCEDURE — 77067 SCR MAMMO BI INCL CAD: CPT | Mod: 26,,, | Performed by: RADIOLOGY

## 2020-12-09 PROCEDURE — 77063 BREAST TOMOSYNTHESIS BI: CPT | Mod: 26,,, | Performed by: RADIOLOGY

## 2020-12-09 PROCEDURE — 77063 MAMMO DIGITAL SCREENING BILAT WITH TOMO: ICD-10-PCS | Mod: 26,,, | Performed by: RADIOLOGY

## 2020-12-09 PROCEDURE — 77067 SCR MAMMO BI INCL CAD: CPT | Mod: TC

## 2020-12-14 ENCOUNTER — PATIENT MESSAGE (OUTPATIENT)
Dept: INTERNAL MEDICINE | Facility: CLINIC | Age: 45
End: 2020-12-14

## 2020-12-14 DIAGNOSIS — D64.9 ANEMIA, UNSPECIFIED TYPE: Primary | ICD-10-CM

## 2020-12-17 ENCOUNTER — OFFICE VISIT (OUTPATIENT)
Dept: INTERNAL MEDICINE | Facility: CLINIC | Age: 45
End: 2020-12-17
Attending: INTERNAL MEDICINE
Payer: COMMERCIAL

## 2020-12-17 DIAGNOSIS — G43.109 MIGRAINE WITH AURA AND WITHOUT STATUS MIGRAINOSUS, NOT INTRACTABLE: Primary | ICD-10-CM

## 2020-12-17 DIAGNOSIS — Z03.818 ENCOUNTER FOR OBSERVATION FOR SUSPECTED EXPOSURE TO OTHER BIOLOGICAL AGENTS RULED OUT: ICD-10-CM

## 2020-12-17 LAB
BASOPHILS # BLD AUTO: 57 CELLS/UL (ref 0–200)
BASOPHILS NFR BLD AUTO: 0.5 %
EOSINOPHIL # BLD AUTO: 362 CELLS/UL (ref 15–500)
EOSINOPHIL NFR BLD AUTO: 3.2 %
ERYTHROCYTE [DISTWIDTH] IN BLOOD BY AUTOMATED COUNT: 14 % (ref 11–15)
HCT VFR BLD AUTO: 37.3 % (ref 35–45)
HCV AB S/CO SERPL IA: 0.02
HCV AB SERPL QL IA: NORMAL
HGB BLD-MCNC: 11.7 G/DL (ref 11.7–15.5)
HIV 1+2 AB+HIV1 P24 AG SERPL QL IA: NORMAL
LYMPHOCYTES # BLD AUTO: 2192 CELLS/UL (ref 850–3900)
LYMPHOCYTES NFR BLD AUTO: 19.4 %
MCH RBC QN AUTO: 27.5 PG (ref 27–33)
MCHC RBC AUTO-ENTMCNC: 31.4 G/DL (ref 32–36)
MCV RBC AUTO: 87.6 FL (ref 80–100)
MONOCYTES # BLD AUTO: 938 CELLS/UL (ref 200–950)
MONOCYTES NFR BLD AUTO: 8.3 %
NEUTROPHILS # BLD AUTO: 7752 CELLS/UL (ref 1500–7800)
NEUTROPHILS NFR BLD AUTO: 68.6 %
PLATELET # BLD AUTO: 326 THOUSAND/UL (ref 140–400)
PMV BLD REES-ECKER: 9.7 FL (ref 7.5–12.5)
RBC # BLD AUTO: 4.26 MILLION/UL (ref 3.8–5.1)
WBC # BLD AUTO: 11.3 THOUSAND/UL (ref 3.8–10.8)

## 2020-12-17 PROCEDURE — 99214 PR OFFICE/OUTPT VISIT, EST, LEVL IV, 30-39 MIN: ICD-10-PCS | Mod: 95,,, | Performed by: INTERNAL MEDICINE

## 2020-12-17 PROCEDURE — 99214 OFFICE O/P EST MOD 30 MIN: CPT | Mod: 95,,, | Performed by: INTERNAL MEDICINE

## 2020-12-17 NOTE — PROGRESS NOTES
Subjective:   Patient ID: Noemi Murillo is a 45 y.o. female  Chief complaint:   No chief complaint on file.      HPI  The patient location is: home  The chief complaint leading to consultation is: migraines    Visit type: audiovisual    Face to Face time with patient: 17 min  19 minutes of total time spent on the encounter, which includes face to face time and non-face to face time preparing to see the patient (eg, review of tests), Obtaining and/or reviewing separately obtained history, Documenting clinical information in the electronic or other health record, Independently interpreting results (not separately reported) and communicating results to the patient/family/caregiver, or Care coordination (not separately reported).         Each patient to whom he or she provides medical services by telemedicine is:  (1) informed of the relationship between the physician and patient and the respective role of any other health care provider with respect to management of the patient; and (2) notified that he or she may decline to receive medical services by telemedicine and may withdraw from such care at any time.    Notes:     Here for f/u of migraines that have recently worsened:   Unable to afford MRI per prior message     No headache today  imitrex helped   No emesis with current meds given prn symtoms with migraine  Still waking with headaches on occ but less frequent  Trigger is 1 week before and during period about 75% of time   Had 2 weeks of no headache since lcv with me which is improved    At [facility]:   Migraines, Tension Headaches:  - occurring at left frontal ha and feels like stabbing at left eye   - reports has been waking with ha with severe ha and will get nausea and diarrhea when pain severe - 5-6 times per month over past 6 months [facility]  - will take zofran prn when sx cause nausea and vomiting - intermittently helpful   - imitrex 50mg can help but not when emesis as difficulty with  keeping med down   - will sit in shower for 1 hour which at times can be helpful   - due for eye exam    Previously: tried   Taking magnesium oxide    Review of Systems      Objective:  There were no vitals filed for this visit.  There is no height or weight on file to calculate BMI.    Physical Exam  Vitals signs reviewed.   Constitutional:       General: She is not in acute distress.     Appearance: Normal appearance. She is well-developed. She is not diaphoretic.   HENT:      Head: Normocephalic and atraumatic.   Eyes:      General:         Right eye: No discharge.         Left eye: No discharge.      Extraocular Movements: Extraocular movements intact.      Conjunctiva/sclera: Conjunctivae normal.   Neck:      Thyroid: No thyromegaly.   Pulmonary:      Effort: Pulmonary effort is normal. No respiratory distress.   Skin:     Capillary Refill: Capillary refill takes less than 2 seconds.      Findings: No erythema or rash.      Nails: There is no clubbing.     Neurological:      Mental Status: She is alert and oriented to person, place, and time. Mental status is at baseline.   Psychiatric:         Mood and Affect: Mood normal.         Speech: Speech normal.         Behavior: Behavior normal.         Thought Content: Thought content normal.         Judgment: Judgment normal.       Assessment:  1. Migraine with aura and without status migrainosus, not intractable        Plan:    F/u with Eye exam and Sleep evaluation   F/u with specialists   Unable to afford MRI - migraines improved since lcv - less frequent   - responding to meds given   - for now will continue to manage - discussed at length ER and RTC visits and if any sx worsen will pursue MRI asap     Health Maintenance   Topic Date Due    TETANUS VACCINE  09/08/2020    Mammogram  12/09/2022    Hepatitis C Screening  Completed    Lipid Panel  Completed

## 2020-12-28 ENCOUNTER — TELEPHONE (OUTPATIENT)
Dept: INTERNAL MEDICINE | Facility: CLINIC | Age: 45
End: 2020-12-28

## 2020-12-28 DIAGNOSIS — D72.829 LEUKOCYTOSIS, UNSPECIFIED TYPE: Primary | ICD-10-CM

## 2020-12-29 NOTE — TELEPHONE ENCOUNTER
Message sent to pt via my chart with results and updates to plan.   - please schedule cbc in 2 weeks

## 2021-01-04 ENCOUNTER — PATIENT OUTREACH (OUTPATIENT)
Dept: ADMINISTRATIVE | Facility: OTHER | Age: 46
End: 2021-01-04

## 2021-01-06 ENCOUNTER — OFFICE VISIT (OUTPATIENT)
Dept: OTOLARYNGOLOGY | Facility: CLINIC | Age: 46
End: 2021-01-06
Payer: COMMERCIAL

## 2021-01-06 VITALS — SYSTOLIC BLOOD PRESSURE: 135 MMHG | DIASTOLIC BLOOD PRESSURE: 95 MMHG | HEART RATE: 70 BPM

## 2021-01-06 DIAGNOSIS — R49.0 DYSPHONIA: Primary | ICD-10-CM

## 2021-01-06 DIAGNOSIS — R49.9 CHANGE IN VOICE: ICD-10-CM

## 2021-01-06 PROCEDURE — 99213 OFFICE O/P EST LOW 20 MIN: CPT | Mod: 25,S$GLB,, | Performed by: OTOLARYNGOLOGY

## 2021-01-06 PROCEDURE — 99999 PR PBB SHADOW E&M-EST. PATIENT-LVL III: CPT | Mod: PBBFAC,,, | Performed by: OTOLARYNGOLOGY

## 2021-01-06 PROCEDURE — 31579 PR LARYNGOSCOPY, FLEX/RIGID TELESCOPIC, W/STROBOSCOPY: ICD-10-PCS | Mod: S$GLB,,, | Performed by: OTOLARYNGOLOGY

## 2021-01-06 PROCEDURE — 1126F PR PAIN SEVERITY QUANTIFIED, NO PAIN PRESENT: ICD-10-PCS | Mod: S$GLB,,, | Performed by: OTOLARYNGOLOGY

## 2021-01-06 PROCEDURE — 31579 LARYNGOSCOPY TELESCOPIC: CPT | Mod: S$GLB,,, | Performed by: OTOLARYNGOLOGY

## 2021-01-06 PROCEDURE — 99999 PR PBB SHADOW E&M-EST. PATIENT-LVL III: ICD-10-PCS | Mod: PBBFAC,,, | Performed by: OTOLARYNGOLOGY

## 2021-01-06 PROCEDURE — 1126F AMNT PAIN NOTED NONE PRSNT: CPT | Mod: S$GLB,,, | Performed by: OTOLARYNGOLOGY

## 2021-01-06 PROCEDURE — 99213 PR OFFICE/OUTPT VISIT, EST, LEVL III, 20-29 MIN: ICD-10-PCS | Mod: 25,S$GLB,, | Performed by: OTOLARYNGOLOGY

## 2021-03-20 ENCOUNTER — IMMUNIZATION (OUTPATIENT)
Dept: PRIMARY CARE CLINIC | Facility: CLINIC | Age: 46
End: 2021-03-20
Payer: COMMERCIAL

## 2021-03-20 DIAGNOSIS — Z23 NEED FOR VACCINATION: Primary | ICD-10-CM

## 2021-03-20 PROCEDURE — 0011A COVID-19, MRNA, LNP-S, PF, 100 MCG/0.5 ML DOSE VACCINE: CPT | Mod: PBBFAC | Performed by: FAMILY MEDICINE

## 2021-04-17 ENCOUNTER — IMMUNIZATION (OUTPATIENT)
Dept: PRIMARY CARE CLINIC | Facility: CLINIC | Age: 46
End: 2021-04-17
Payer: COMMERCIAL

## 2021-04-17 DIAGNOSIS — Z23 NEED FOR VACCINATION: Primary | ICD-10-CM

## 2021-04-17 PROCEDURE — 0012A COVID-19, MRNA, LNP-S, PF, 100 MCG/0.5 ML DOSE VACCINE: CPT | Mod: PBBFAC | Performed by: FAMILY MEDICINE

## 2021-07-06 ENCOUNTER — PATIENT MESSAGE (OUTPATIENT)
Dept: ADMINISTRATIVE | Facility: HOSPITAL | Age: 46
End: 2021-07-06

## 2021-10-05 ENCOUNTER — PATIENT MESSAGE (OUTPATIENT)
Dept: ADMINISTRATIVE | Facility: HOSPITAL | Age: 46
End: 2021-10-05

## 2021-11-17 ENCOUNTER — PATIENT MESSAGE (OUTPATIENT)
Dept: ADMINISTRATIVE | Facility: HOSPITAL | Age: 46
End: 2021-11-17
Payer: COMMERCIAL

## 2021-11-17 ENCOUNTER — PATIENT OUTREACH (OUTPATIENT)
Dept: ADMINISTRATIVE | Facility: HOSPITAL | Age: 46
End: 2021-11-17
Payer: COMMERCIAL

## 2022-02-23 DIAGNOSIS — Z12.31 OTHER SCREENING MAMMOGRAM: ICD-10-CM

## 2022-04-20 DIAGNOSIS — Z12.11 COLON CANCER SCREENING: ICD-10-CM

## 2022-04-25 ENCOUNTER — PATIENT MESSAGE (OUTPATIENT)
Dept: INTERNAL MEDICINE | Facility: CLINIC | Age: 47
End: 2022-04-25
Payer: COMMERCIAL

## 2022-05-30 ENCOUNTER — PATIENT MESSAGE (OUTPATIENT)
Dept: ADMINISTRATIVE | Facility: HOSPITAL | Age: 47
End: 2022-05-30
Payer: COMMERCIAL

## 2022-08-24 ENCOUNTER — PATIENT MESSAGE (OUTPATIENT)
Dept: INTERNAL MEDICINE | Facility: CLINIC | Age: 47
End: 2022-08-24
Payer: COMMERCIAL

## 2022-10-05 ENCOUNTER — HOSPITAL ENCOUNTER (OUTPATIENT)
Dept: RADIOLOGY | Facility: OTHER | Age: 47
Discharge: HOME | End: 2022-10-05
Attending: INTERNAL MEDICINE
Payer: COMMERCIAL

## 2022-10-05 DIAGNOSIS — Z12.31 OTHER SCREENING MAMMOGRAM: ICD-10-CM

## 2022-10-05 PROCEDURE — 77063 BREAST TOMOSYNTHESIS BI: CPT | Mod: 26,,, | Performed by: RADIOLOGY

## 2022-10-05 PROCEDURE — 77063 MAMMO DIGITAL SCREENING BILAT WITH TOMO: ICD-10-PCS | Mod: 26,,, | Performed by: RADIOLOGY

## 2022-10-05 PROCEDURE — 77063 BREAST TOMOSYNTHESIS BI: CPT | Mod: TC

## 2022-10-05 PROCEDURE — 77067 MAMMO DIGITAL SCREENING BILAT WITH TOMO: ICD-10-PCS | Mod: 26,,, | Performed by: RADIOLOGY

## 2022-10-05 PROCEDURE — 77067 SCR MAMMO BI INCL CAD: CPT | Mod: 26,,, | Performed by: RADIOLOGY

## 2022-11-06 ENCOUNTER — PATIENT MESSAGE (OUTPATIENT)
Dept: INTERNAL MEDICINE | Facility: CLINIC | Age: 47
End: 2022-11-06
Payer: COMMERCIAL

## 2022-11-06 DIAGNOSIS — D64.9 ANEMIA, UNSPECIFIED TYPE: Primary | ICD-10-CM

## 2022-11-07 NOTE — TELEPHONE ENCOUNTER
Reviewed labs and up to date on annual labs   Labs showed mild anemia - recommend checking anemia labs to evaluate causes of this

## 2022-11-16 ENCOUNTER — OFFICE VISIT (OUTPATIENT)
Dept: INTERNAL MEDICINE | Facility: CLINIC | Age: 47
End: 2022-11-16
Attending: INTERNAL MEDICINE
Payer: COMMERCIAL

## 2022-11-16 VITALS
DIASTOLIC BLOOD PRESSURE: 82 MMHG | HEART RATE: 72 BPM | SYSTOLIC BLOOD PRESSURE: 114 MMHG | BODY MASS INDEX: 43.4 KG/M2 | WEIGHT: 293 LBS | HEIGHT: 69 IN | OXYGEN SATURATION: 97 %

## 2022-11-16 DIAGNOSIS — D50.9 IRON DEFICIENCY ANEMIA, UNSPECIFIED IRON DEFICIENCY ANEMIA TYPE: ICD-10-CM

## 2022-11-16 DIAGNOSIS — Z12.11 SCREENING FOR COLON CANCER: ICD-10-CM

## 2022-11-16 DIAGNOSIS — E66.01 SEVERE OBESITY (BMI >= 40): ICD-10-CM

## 2022-11-16 DIAGNOSIS — E53.8 LOW SERUM VITAMIN B12: ICD-10-CM

## 2022-11-16 DIAGNOSIS — F41.9 ANXIETY AND DEPRESSION: ICD-10-CM

## 2022-11-16 DIAGNOSIS — E89.0 POSTOPERATIVE HYPOTHYROIDISM: ICD-10-CM

## 2022-11-16 DIAGNOSIS — Z00.00 ANNUAL PHYSICAL EXAM: Primary | ICD-10-CM

## 2022-11-16 DIAGNOSIS — Z01.419 VISIT FOR PELVIC EXAM: ICD-10-CM

## 2022-11-16 DIAGNOSIS — F32.A ANXIETY AND DEPRESSION: ICD-10-CM

## 2022-11-16 PROBLEM — E04.1 THYROID NODULE: Status: RESOLVED | Noted: 2020-12-05 | Resolved: 2022-11-16

## 2022-11-16 LAB
FERRITIN SERPL-MCNC: 20 NG/ML (ref 16–232)
FOLATE SERPL-MCNC: 7 NG/ML
IRON SATN MFR SERPL: 9 % (CALC) (ref 16–45)
IRON SERPL-MCNC: 38 MCG/DL (ref 40–190)
TIBC SERPL-MCNC: 439 MCG/DL (CALC) (ref 250–450)
VIT B12 SERPL-MCNC: 278 PG/ML (ref 200–1100)

## 2022-11-16 PROCEDURE — 3079F DIAST BP 80-89 MM HG: CPT | Mod: CPTII,S$GLB,, | Performed by: INTERNAL MEDICINE

## 2022-11-16 PROCEDURE — 3079F PR MOST RECENT DIASTOLIC BLOOD PRESSURE 80-89 MM HG: ICD-10-PCS | Mod: CPTII,S$GLB,, | Performed by: INTERNAL MEDICINE

## 2022-11-16 PROCEDURE — 3074F SYST BP LT 130 MM HG: CPT | Mod: CPTII,S$GLB,, | Performed by: INTERNAL MEDICINE

## 2022-11-16 PROCEDURE — 99396 PR PREVENTIVE VISIT,EST,40-64: ICD-10-PCS | Mod: S$GLB,,, | Performed by: INTERNAL MEDICINE

## 2022-11-16 PROCEDURE — 3074F PR MOST RECENT SYSTOLIC BLOOD PRESSURE < 130 MM HG: ICD-10-PCS | Mod: CPTII,S$GLB,, | Performed by: INTERNAL MEDICINE

## 2022-11-16 PROCEDURE — 1160F PR REVIEW ALL MEDS BY PRESCRIBER/CLIN PHARMACIST DOCUMENTED: ICD-10-PCS | Mod: CPTII,S$GLB,, | Performed by: INTERNAL MEDICINE

## 2022-11-16 PROCEDURE — 99396 PREV VISIT EST AGE 40-64: CPT | Mod: S$GLB,,, | Performed by: INTERNAL MEDICINE

## 2022-11-16 PROCEDURE — 1159F PR MEDICATION LIST DOCUMENTED IN MEDICAL RECORD: ICD-10-PCS | Mod: CPTII,S$GLB,, | Performed by: INTERNAL MEDICINE

## 2022-11-16 PROCEDURE — 3008F BODY MASS INDEX DOCD: CPT | Mod: CPTII,S$GLB,, | Performed by: INTERNAL MEDICINE

## 2022-11-16 PROCEDURE — 99999 PR PBB SHADOW E&M-EST. PATIENT-LVL IV: ICD-10-PCS | Mod: PBBFAC,,, | Performed by: INTERNAL MEDICINE

## 2022-11-16 PROCEDURE — 99999 PR PBB SHADOW E&M-EST. PATIENT-LVL IV: CPT | Mod: PBBFAC,,, | Performed by: INTERNAL MEDICINE

## 2022-11-16 PROCEDURE — 3008F PR BODY MASS INDEX (BMI) DOCUMENTED: ICD-10-PCS | Mod: CPTII,S$GLB,, | Performed by: INTERNAL MEDICINE

## 2022-11-16 PROCEDURE — 1159F MED LIST DOCD IN RCRD: CPT | Mod: CPTII,S$GLB,, | Performed by: INTERNAL MEDICINE

## 2022-11-16 PROCEDURE — 1160F RVW MEDS BY RX/DR IN RCRD: CPT | Mod: CPTII,S$GLB,, | Performed by: INTERNAL MEDICINE

## 2022-11-16 RX ORDER — CYANOCOBALAMIN (VITAMIN B-12) 1000 MCG
1000 TABLET, SUBLINGUAL SUBLINGUAL DAILY
Qty: 90 TABLET | Refills: 3 | Status: SHIPPED | OUTPATIENT
Start: 2022-11-16 | End: 2024-01-16

## 2022-11-16 RX ORDER — ARIPIPRAZOLE 5 MG/1
5 TABLET ORAL DAILY
COMMUNITY
Start: 2022-10-26

## 2022-11-16 RX ORDER — LEVOTHYROXINE SODIUM 200 UG/1
TABLET ORAL
COMMUNITY
Start: 2021-03-02 | End: 2023-04-25 | Stop reason: SDUPTHER

## 2022-11-16 NOTE — PATIENT INSTRUCTIONS
Your iron is low.   I recommend starting over the counter iron supplements (Fergon or Feosol 325mg) 1 time every other day for 1-2 weeks and if tolerating then increase to 1-2 times daily with meals. Take this with food to avoid an upset stomach as this can cause nausea and black stools. It can also cause constipation. Taking each dose of iron with an over the counter stool softener called colace 100mg can help prevent constipation.   I also recommend to increase dietary sources of iron including dark leafy vegetables like spinach, iron-fortified cereals, breads, pastas, beans, peas, lean red meat or pork or poultry, and seafood. You can enhance absorption of iron by drinking citrus juice like OJ or eathing other foods high in Vit C a the same time you eat iron rich foods. Please let me know if you have any questions.

## 2022-11-16 NOTE — PROGRESS NOTES
Subjective:   Patient ID: Noemi Murillo is a 47 y.o. female  Chief complaint:   Chief Complaint   Patient presents with    Annual Exam     Anemia f/u        HPI  Pt here for annual exam     Working for [employer] over past year - 3 days at home and 2 days in office     Annual labs ordered by her endocrinologist in patient message 11/6/2022   - microcytic anemia: intermittent periods - not always heavy flow   - low B12 278 - not vegetarian/vegan at this time     Post op hypothyroidism, thyroid nodules:  - taking 200mcg Synthroid daily and 400mg on Sundays   Had thyroidectomy 3/2021    - followed by endocrinology outside [facility] - Dr. Guzman at [facility]    Migraines, Tension Headaches:  - migraines resolved   - dehydration is trigger and avoiding this - will take asa with water if needed   - improved at last VV - unable to afford MRI - discussed can revisit in future if sx change  Previously:  - occurring at left frontal ha and feels like stabbing at left eye   - reports has been waking with ha with severe ha and will get nausea and diarrhea when pain severe - 5-6 times per month over past 6 months which is new from [facility] HA hx  - will take zofran prn when sx cause nausea and vomiting - intermittently helpful   - imitrex 50mg can help but not when emesis as difficulty with keeping med down   - will sit in shower for 1 hour which at times can be helpful   - due for eye exam  Previously: tried   Taking magnesium oxide    Depression and anxiety:   - followed by psychiatry   - mood improved   - taking fluoxetine 80, lithium 900mg, abilify 5mg   - taking restoril qhs prn insomnia     Previously was taking vit d 5k  Level in good range along with pth level in message attachment     Reports at times feels like needs to move legs when falling asleep  - feels urge to move legs at times when resting    Previously:   Reports having more mm spasams in thoracic back   Had xray at  a few years ago and told had arthritis -  "no numbness or weakness but pain worsening     HM:  Tdap  Pap  Flu  Cscope  Covid booster     Review of Systems    Objective:  Vitals:    11/16/22 0756   BP: 114/82   BP Location: Left arm   Patient Position: Sitting   Pulse: 72   SpO2: 97%   Weight: (!) 138 kg (304 lb 3.8 oz)   Height: 5' 9" (1.753 m)     Body mass index is 44.93 kg/m².    Physical Exam  Vitals reviewed.   Constitutional:       Appearance: Normal appearance. She is well-developed.   HENT:      Head: Normocephalic and atraumatic.      Right Ear: Tympanic membrane, ear canal and external ear normal.      Left Ear: Tympanic membrane, ear canal and external ear normal.      Nose: Nose normal. No congestion.      Mouth/Throat:      Mouth: Mucous membranes are moist.      Pharynx: Oropharynx is clear. No oropharyngeal exudate.   Eyes:      Extraocular Movements: Extraocular movements intact.      Conjunctiva/sclera: Conjunctivae normal.      Pupils: Pupils are equal, round, and reactive to light.   Neck:      Thyroid: No thyromegaly.   Cardiovascular:      Rate and Rhythm: Normal rate and regular rhythm.      Pulses: Normal pulses.      Heart sounds: Normal heart sounds.   Pulmonary:      Effort: Pulmonary effort is normal.      Breath sounds: Normal breath sounds.   Abdominal:      General: Bowel sounds are normal.      Palpations: Abdomen is soft.   Musculoskeletal:         General: No swelling or tenderness.      Cervical back: Normal range of motion and neck supple.   Lymphadenopathy:      Cervical: No cervical adenopathy.   Skin:     General: Skin is warm and dry.      Capillary Refill: Capillary refill takes less than 2 seconds.      Nails: There is no clubbing.   Neurological:      General: No focal deficit present.      Mental Status: She is alert and oriented to person, place, and time. Mental status is at baseline.      Coordination: Coordination normal.      Gait: Gait normal.   Psychiatric:         Speech: Speech normal.         Behavior: " Behavior normal.         Thought Content: Thought content normal.     Assessment:  1. Annual physical exam    2. Low serum vitamin B12    3. Screening for colon cancer    4. Iron deficiency anemia, unspecified iron deficiency anemia type    5. Visit for pelvic exam    6. Anxiety and depression    7. Severe obesity (BMI >= 40)    8. Postoperative hypothyroidism        Plan:  Noemi was seen today for annual exam.    Diagnoses and all orders for this visit:    Annual physical exam    Low serum vitamin B12  -     METHYLMALONIC ACID, SERUM; Future  -     HOMOCYSTEINE, SERUM; Future  -     cyanocobalamin, vitamin B-12, 500 mcg Subl; Place 1,000 mcg under the tongue once daily.  -     METHYLMALONIC ACID, SERUM  -     HOMOCYSTEINE, SERUM  -     Vitamin B12; Future  -     Vitamin B12  Check mma and homocysteine  Start oral b12   Check b12 in 3 months and cbc   Start oral iron as tolerated     Screening for colon cancer  -     Ambulatory referral/consult to Endo Procedure ; Future    Iron deficiency anemia, unspecified iron deficiency anemia type  -     FERRITIN; Future  -     IRON AND TIBC; Future  -     CBC Auto Differential; Future    Visit for pelvic exam  -     Ambulatory referral/consult to Obstetrics / Gynecology; Future    Anxiety and depression  Stable   Followed by psych    Severe obesity (BMI >= 40)  - cont diet and exercise  - increase intensity and duration of CV exercise to continue weight loss  - goal wt loss one pound per week  - portion control, healthy choices    Postoperative hypothyroidism    Reviewed annual labs ordered by endo   Check additional labs   Cont current meds   Keep appt with specialists     Health Maintenance   Topic Date Due    TETANUS VACCINE  09/08/2020    Mammogram  10/05/2023    Lipid Panel  07/22/2027    Hepatitis C Screening  Completed

## 2022-11-25 LAB — HCYS SERPL-SCNC: 12.2 UMOL/L

## 2022-11-26 LAB — METHYLMALONATE SERPL-SCNC: 226 NMOL/L (ref 87–318)

## 2022-12-02 ENCOUNTER — IMMUNIZATION (OUTPATIENT)
Dept: PRIMARY CARE CLINIC | Facility: CLINIC | Age: 47
End: 2022-12-02
Payer: COMMERCIAL

## 2022-12-02 DIAGNOSIS — Z23 NEED FOR VACCINATION: Primary | ICD-10-CM

## 2022-12-02 PROCEDURE — 91313 COVID-19, MRNA, LNP-S, BIVALENT BOOSTER, PF, 50 MCG/0.5 ML: ICD-10-PCS | Mod: S$GLB,,, | Performed by: EMERGENCY MEDICINE

## 2022-12-02 PROCEDURE — 91313 COVID-19, MRNA, LNP-S, BIVALENT BOOSTER, PF, 50 MCG/0.5 ML: CPT | Mod: S$GLB,,, | Performed by: EMERGENCY MEDICINE

## 2022-12-02 PROCEDURE — 0134A COVID-19, MRNA, LNP-S, BIVALENT BOOSTER, PF, 50 MCG/0.5 ML: CPT | Mod: PBBFAC | Performed by: EMERGENCY MEDICINE

## 2022-12-29 ENCOUNTER — CLINICAL SUPPORT (OUTPATIENT)
Dept: ENDOSCOPY | Facility: HOSPITAL | Age: 47
End: 2022-12-29
Attending: INTERNAL MEDICINE
Payer: COMMERCIAL

## 2022-12-29 VITALS — WEIGHT: 293 LBS | HEIGHT: 69 IN | BODY MASS INDEX: 43.4 KG/M2

## 2022-12-29 DIAGNOSIS — Z12.11 SCREENING FOR COLON CANCER: ICD-10-CM

## 2022-12-29 DIAGNOSIS — Z12.11 SPECIAL SCREENING FOR MALIGNANT NEOPLASMS, COLON: Primary | ICD-10-CM

## 2022-12-29 RX ORDER — POLYETHYLENE GLYCOL 3350, SODIUM SULFATE ANHYDROUS, SODIUM BICARBONATE, SODIUM CHLORIDE, POTASSIUM CHLORIDE 236; 22.74; 6.74; 5.86; 2.97 G/4L; G/4L; G/4L; G/4L; G/4L
4 POWDER, FOR SOLUTION ORAL ONCE
Qty: 4000 ML | Refills: 0 | Status: SHIPPED | OUTPATIENT
Start: 2022-12-29 | End: 2022-12-29

## 2023-01-18 ENCOUNTER — PATIENT MESSAGE (OUTPATIENT)
Dept: ADMINISTRATIVE | Facility: HOSPITAL | Age: 48
End: 2023-01-18
Payer: COMMERCIAL

## 2023-03-10 ENCOUNTER — PATIENT MESSAGE (OUTPATIENT)
Dept: ENDOSCOPY | Facility: HOSPITAL | Age: 48
End: 2023-03-10
Payer: COMMERCIAL

## 2023-03-14 ENCOUNTER — ANESTHESIA EVENT (OUTPATIENT)
Dept: ENDOSCOPY | Facility: HOSPITAL | Age: 48
End: 2023-03-14
Payer: COMMERCIAL

## 2023-03-14 ENCOUNTER — HOSPITAL ENCOUNTER (OUTPATIENT)
Facility: HOSPITAL | Age: 48
Discharge: HOME | End: 2023-03-14
Attending: COLON & RECTAL SURGERY | Admitting: COLON & RECTAL SURGERY
Payer: COMMERCIAL

## 2023-03-14 ENCOUNTER — ANESTHESIA (OUTPATIENT)
Dept: ENDOSCOPY | Facility: HOSPITAL | Age: 48
End: 2023-03-14
Payer: COMMERCIAL

## 2023-03-14 VITALS
HEART RATE: 63 BPM | TEMPERATURE: 98 F | SYSTOLIC BLOOD PRESSURE: 115 MMHG | OXYGEN SATURATION: 98 % | HEIGHT: 69 IN | WEIGHT: 293 LBS | RESPIRATION RATE: 16 BRPM | DIASTOLIC BLOOD PRESSURE: 75 MMHG | BODY MASS INDEX: 43.4 KG/M2

## 2023-03-14 DIAGNOSIS — Z12.11 SCREEN FOR COLON CANCER: Primary | ICD-10-CM

## 2023-03-14 LAB
B-HCG UR QL: NEGATIVE
CTP QC/QA: YES

## 2023-03-14 PROCEDURE — 45385 COLONOSCOPY W/LESION REMOVAL: CPT | Mod: 33,,, | Performed by: COLON & RECTAL SURGERY

## 2023-03-14 PROCEDURE — 25000003 PHARM REV CODE 250: Performed by: NURSE ANESTHETIST, CERTIFIED REGISTERED

## 2023-03-14 PROCEDURE — E9220 PRA ENDO ANESTHESIA: ICD-10-PCS | Mod: 33,,, | Performed by: NURSE ANESTHETIST, CERTIFIED REGISTERED

## 2023-03-14 PROCEDURE — 27201089 HC SNARE, DISP (ANY): Performed by: COLON & RECTAL SURGERY

## 2023-03-14 PROCEDURE — 45385 PR COLONOSCOPY,REMV LESN,SNARE: ICD-10-PCS | Mod: 33,,, | Performed by: COLON & RECTAL SURGERY

## 2023-03-14 PROCEDURE — 63600175 PHARM REV CODE 636 W HCPCS: Performed by: NURSE ANESTHETIST, CERTIFIED REGISTERED

## 2023-03-14 PROCEDURE — 88305 TISSUE EXAM BY PATHOLOGIST: CPT | Mod: 26,,, | Performed by: PATHOLOGY

## 2023-03-14 PROCEDURE — E9220 PRA ENDO ANESTHESIA: HCPCS | Mod: 33,,, | Performed by: NURSE ANESTHETIST, CERTIFIED REGISTERED

## 2023-03-14 PROCEDURE — 88305 TISSUE EXAM BY PATHOLOGIST: CPT | Performed by: PATHOLOGY

## 2023-03-14 PROCEDURE — 88305 TISSUE EXAM BY PATHOLOGIST: ICD-10-PCS | Mod: 26,,, | Performed by: PATHOLOGY

## 2023-03-14 PROCEDURE — 45385 COLONOSCOPY W/LESION REMOVAL: CPT | Mod: PT | Performed by: COLON & RECTAL SURGERY

## 2023-03-14 PROCEDURE — 37000008 HC ANESTHESIA 1ST 15 MINUTES: Performed by: COLON & RECTAL SURGERY

## 2023-03-14 PROCEDURE — 37000009 HC ANESTHESIA EA ADD 15 MINS: Performed by: COLON & RECTAL SURGERY

## 2023-03-14 PROCEDURE — 81025 URINE PREGNANCY TEST: CPT | Performed by: COLON & RECTAL SURGERY

## 2023-03-14 RX ORDER — PROPOFOL 10 MG/ML
VIAL (ML) INTRAVENOUS
Status: DISCONTINUED | OUTPATIENT
Start: 2023-03-14 | End: 2023-03-14

## 2023-03-14 RX ORDER — PROPOFOL 10 MG/ML
VIAL (ML) INTRAVENOUS CONTINUOUS PRN
Status: DISCONTINUED | OUTPATIENT
Start: 2023-03-14 | End: 2023-03-14

## 2023-03-14 RX ORDER — LIDOCAINE HYDROCHLORIDE 20 MG/ML
INJECTION INTRAVENOUS
Status: DISCONTINUED | OUTPATIENT
Start: 2023-03-14 | End: 2023-03-14

## 2023-03-14 RX ORDER — SODIUM CHLORIDE 9 MG/ML
INJECTION, SOLUTION INTRAVENOUS CONTINUOUS
Status: DISCONTINUED | OUTPATIENT
Start: 2023-03-14 | End: 2023-03-14 | Stop reason: HOSPADM

## 2023-03-14 RX ADMIN — PROPOFOL 70 MG: 10 INJECTION, EMULSION INTRAVENOUS at 10:03

## 2023-03-14 RX ADMIN — SODIUM CHLORIDE: 0.9 INJECTION, SOLUTION INTRAVENOUS at 10:03

## 2023-03-14 RX ADMIN — PROPOFOL 40 MG: 10 INJECTION, EMULSION INTRAVENOUS at 10:03

## 2023-03-14 RX ADMIN — Medication 150 MCG/KG/MIN: at 10:03

## 2023-03-14 RX ADMIN — LIDOCAINE HYDROCHLORIDE 30 MG: 20 INJECTION INTRAVENOUS at 10:03

## 2023-03-14 NOTE — ANESTHESIA PREPROCEDURE EVALUATION
03/14/2023  Noemi Murillo is a 47 y.o., female.      Patient Name: Noemi Murillo  YOB: 1975  MRN: [id number]  [facility]: [id number]      Code Status: No Order   Date of Procedure: 3/14/2023  Anesthesia: Choice Procedure: Procedure(s) (LRB):  COLONOSCOPY (N/A)  Pre-Operative Diagnosis: Screening for colon cancer [Z12.11]  Proceduralist: Surgeon(s) and Role:     * ANNALISE Barahona MD - Primary        SUBJECTIVE:   Noemi Murillo is a 47 y.o. female who  has a past medical history of Depression and Insomnia. No notes on file    ALLERGIES:   Review of patient's allergies indicates:  No Known Allergies  MEDICATIONS:     Current Facility-Administered Medications   Medication Dose Route Frequency Provider Last Rate Last Admin    0.9%  NaCl infusion   Intravenous Continuous ANNALISE Barahona MD              History:     Patient Active Problem List   Diagnosis    Vegan diet    Anxiety and depression    Insomnia    Hypothyroidism    Anemia    Class 3 severe obesity with body mass index (BMI) of 40.0 to 44.9 in adult    Severe obesity (BMI >= 40)    Muscle spasm    Change in voice    Migraine with aura and without status migrainosus, not intractable    Low serum vitamin B12     Surgical History:    has a past surgical history that includes Tonsillectomy; Incision and drainage of wound (2012); Adenoidectomy (?); and Total thyroidectomy (03/2021).   Social History:    reports being sexually active and has had partner(s) who are male. She reports using the following method of birth control/protection: Partner-Vasectomy.  reports that she quit smoking about 14 years ago. Her smoking use included cigarettes. She started smoking about 27 years ago. She has a 3.25 pack-year smoking history. She has never used smokeless tobacco. She reports current alcohol use of about 1.0 - 2.0 standard drink  per week. She reports current drug use. Frequency: 5.00 times per week. Drug: Marijuana.       Pre-op Assessment    I have reviewed the Patient Summary Reports.     I have reviewed the Nursing Notes. I have reviewed the NPO Status.   I have reviewed the Medications.     Review of Systems  Anesthesia Hx:  No problems with previous Anesthesia  Denies Family Hx of Anesthesia complications.   Denies Personal Hx of Anesthesia complications.   Hematology/Oncology:  Hematology Normal        Cardiovascular:  Cardiovascular Normal     Hepatic/GI:   Bowel Prep.    Musculoskeletal:  Musculoskeletal Normal    Neurological:  Neurology Normal    Dermatological:  Skin Normal        Physical Exam  General: Cooperative, Alert and Oriented    Airway:  Mallampati: II   Mouth Opening: Normal  TM Distance: Normal  Tongue: Normal  Neck ROM: Normal ROM    Dental:  Intact        Anesthesia Plan  Type of Anesthesia, risks & benefits discussed:    Anesthesia Type: Gen Natural Airway  Intra-op Monitoring Plan: Standard ASA Monitors  Induction:  IV  Informed Consent: Informed consent signed with the Patient and all parties understand the risks and agree with anesthesia plan.  All questions answered.   ASA Score: 1  Day of Surgery Review of History & Physical: H&P Update referred to the surgeon/provider.I have interviewed and examined the patient. I have reviewed the patient's H&P dated: There are no significant changes.     Ready For Surgery From Anesthesia Perspective.     .

## 2023-03-14 NOTE — H&P
COLONOSCOPY HISTORY AND PE    Procedure : Colonoscopy      INDICATIONS: asymptomatic screening exam      Past Medical History:   Diagnosis Date    Depression     Insomnia        Past Surgical History:   Procedure Laterality Date    ADENOIDECTOMY  ?    I think they took these out with my tonsils.    INCISION AND DRAINAGE OF WOUND      right leg     TONSILLECTOMY      TOTAL THYROIDECTOMY  2021       Review of patient's allergies indicates:  No Known Allergies    No current facility-administered medications on file prior to encounter.     Current Outpatient Medications on File Prior to Encounter   Medication Sig Dispense Refill    ARIPiprazole (ABILIFY) 5 MG Tab Take 5 mg by mouth once daily.      cyanocobalamin, vitamin B-12, 500 mcg Subl Place 1,000 mcg under the tongue once daily. 90 tablet 3    FLUoxetine 40 MG capsule Take 80 mg by mouth once daily.      levothyroxine (SYNTHROID) 200 MCG tablet Pt takes 1 per day and 2 on       lithium (LITHOTAB) 300 mg tablet Take 900 mg by mouth every evening.      temazepam (RESTORIL) 30 mg capsule Take 30 mg by mouth nightly as needed.  0       Family History   Problem Relation Age of Onset    Arthritis Mother         Also, all my older relatives have arthritis.    Depression Mother     No Known Problems Father     No Known Problems Sister     Breast cancer Paternal Aunt     Cancer Paternal Aunt         breast cancer    Stroke Maternal Grandmother     Hearing loss Maternal Grandfather         All my grandparents had hearing aids.    Colon cancer Neg Hx     Ovarian cancer Neg Hx        Social History     Socioeconomic History    Marital status: Single     Spouse name: Sinan     Number of children: 0   Occupational History    Occupation: bakery    Tobacco Use    Smoking status: Former     Packs/day: 0.25     Years: 13.00     Pack years: 3.25     Types: Cigarettes     Start date: 10/10/1995     Quit date: 10/10/2008     Years since quittin.4    Smokeless  tobacco: Never   Substance and Sexual Activity    Alcohol use: Yes     Alcohol/week: 1.0 - 2.0 standard drink     Types: 1 - 2 Glasses of wine per week    Drug use: Yes     Frequency: 5.0 times per week     Types: Marijuana    Sexual activity: Yes     Partners: Male     Birth control/protection: Partner-Vasectomy   Social History Narrative    From [place]     Moved to [facility] in 2004    Exercising intermittently - yoga      Social Determinants of Health     Financial Resource Strain: Medium Risk    Difficulty of Paying Living Expenses: Somewhat hard   Food Insecurity: No Food Insecurity    Worried About Running Out of Food in the Last Year: Never true    Ran Out of Food in the Last Year: Never true   Transportation Needs: No Transportation Needs    Lack of Transportation (Medical): No    Lack of Transportation (Non-Medical): No   Physical Activity: Insufficiently Active    Days of Exercise per Week: 2 days    Minutes of Exercise per Session: 60 min   Stress: Stress Concern Present    Feeling of Stress : To some extent   Social Connections: Unknown    Frequency of Communication with Friends and Family: Never    Frequency of Social Gatherings with Friends and Family: Once a week    Active Member of Clubs or Organizations: No    Attends Club or Organization Meetings: Never    Marital Status: Living with partner   Housing Stability: Low Risk     Unable to Pay for Housing in the Last Year: No    Number of Places Lived in the Last Year: 1    Unstable Housing in the Last Year: No       Review of Systems -    Respiratory : no cough, shortness of breath, or wheezing  Cardiovascular  no chest pain or dyspnea on exertion  Gastrointestinal no abdominal pain, change in bowel habits, or black or bloody stools  Musculoskeletal no deformities, swelling  Neurological no TIA or stroke symptoms        Physical Exam:  General: NAD  AT NC EOMI  Mallampati Score   Neck supple, trachea midline  Lungs: nl excursions, no retractions.  Breathing  comfortably  Abdomen ND soft NT.  No masses  Extremities: No CCE.      ASA:  I    PLAN  COLONOSCOPY.  The details of the procedure, the possible need for biopsy or polypectomy and the potential risks including bleeding, perforation, missed polyps were discussed in detail.

## 2023-03-14 NOTE — ANESTHESIA POSTPROCEDURE EVALUATION
Anesthesia Post Evaluation    Patient: Noemi Murillo    Procedure(s) Performed: Procedure(s) (LRB):  COLONOSCOPY (N/A)    Final Anesthesia Type: general      Patient location during evaluation: GI PACU  Patient participation: Yes- Able to Participate  Level of consciousness: awake and alert  Post-procedure vital signs: reviewed and stable  Pain management: adequate  Airway patency: patent    PONV status at discharge: No PONV  Anesthetic complications: no      Cardiovascular status: stable  Respiratory status: unassisted and spontaneous ventilation  Hydration status: euvolemic  Follow-up not needed.          Vitals Value Taken Time   /75 03/14/23 1138   Temp 36.6 °C (97.8 °F) 03/14/23 1107   Pulse 63 03/14/23 1138   Resp 16 03/14/23 1138   SpO2 98 % 03/14/23 1138         Event Time   Out of Recovery 11:45:35         Pain/Shefali Score: Shefali Score: 10 (3/14/2023 11:22 AM)

## 2023-03-14 NOTE — PROVATION PATIENT INSTRUCTIONS
Discharge Summary/Instructions after an Endoscopic Procedure  Patient Name: Noemi Murillo  Patient MRN: [id number]  Patient YOB: 1975  Tuesday, March 14, 2023  Tyrel Barahona MD  Dear patient,  As a result of recent federal legislation (The Federal Cures Act), you may   receive lab or pathology results from your procedure in your [facility]   account before your physician is able to contact you. Your physician or   their representative will relay the results to you with their   recommendations at their soonest availability.  Thank you,  RESTRICTIONS:  During your procedure today, you received medications for sedation.  These   medications may affect your judgment, balance and coordination.  Therefore,   for 24 hours, you have the following restrictions:   - DO NOT drive a car, operate machinery, make legal/financial decisions,   sign important papers or drink alcohol.    ACTIVITY:  Today: no heavy lifting, straining or running due to procedural   sedation/anesthesia.  The following day: return to full activity including work.  DIET:  Eat and drink normally unless instructed otherwise.     TREATMENT FOR COMMON SIDE EFFECTS:  - Mild abdominal pain, nausea, belching, bloating or excessive gas:  rest,   eat lightly and use a heating pad.  - Sore Throat: treat with throat lozenges and/or gargle with warm salt   water.  - Because air was used during the procedure, expelling large amounts of air   from your rectum or belching is normal.  - If a bowel prep was taken, you may not have a bowel movement for 1-3 days.    This is normal.  SYMPTOMS TO WATCH FOR AND REPORT TO YOUR PHYSICIAN:  1. Abdominal pain or bloating, other than gas cramps.  2. Chest pain.  3. Back pain.  4. Signs of infection such as: chills or fever occurring within 24 hours   after the procedure.  5. Rectal bleeding, which would show as bright red, maroon, or black stools.   (A tablespoon of blood from the rectum is not serious, especially if    hemorrhoids are present.)  6. Vomiting.  7. Weakness or dizziness.  GO DIRECTLY TO THE NEAREST EMERGENCY ROOM IF YOU HAVE ANY OF THE FOLLOWING:      Difficulty breathing              Chills and/or fever over 101 F   Persistent vomiting and/or vomiting blood   Severe abdominal pain   Severe chest pain   Black, tarry stools   Bleeding- more than one tablespoon   Any other symptom or condition that you feel may need urgent attention  Your doctor recommends these additional instructions:  If any biopsies were taken, your doctors clinic will contact you in 1 to 2   weeks with any results.  - Discharge patient to home (ambulatory).   - Patient has a contact number available for emergencies.  The signs and   symptoms of potential delayed complications were discussed with the   patient.  Return to normal activities tomorrow.  Written discharge   instructions were provided to the patient.   - Resume previous diet.   - Continue present medications.   - Await pathology results.   - Repeat colonoscopy in 7 years for surveillance based on pathology   results.  For questions, problems or results please call your physician - Tyrel Barahona MD at Work:  [phone].  [facility], EMERGENCY ROOM PHONE NUMBER: [phone]  IF A COMPLICATION OR EMERGENCY SITUATION ARISES AND YOU ARE UNABLE TO REACH   YOUR PHYSICIAN - GO DIRECTLY TO THE EMERGENCY ROOM.  Tyrel Barahona MD  3/14/2023 11:05:33 AM  This report has been verified and signed electronically.  Dear patient,  As a result of recent federal legislation (The Federal Cures Act), you may   receive lab or pathology results from your procedure in your [facility]   account before your physician is able to contact you. Your physician or   their representative will relay the results to you with their   recommendations at their soonest availability.  Thank you,  PR[employer]ATION

## 2023-03-14 NOTE — TRANSFER OF CARE
"Anesthesia Transfer of Care Note    Patient: Noemi Murillo    Procedure(s) Performed: Procedure(s) (LRB):  COLONOSCOPY (N/A)    Patient location: PACU    Anesthesia Type: general    Transport from OR: Transported from OR on room air with adequate spontaneous ventilation    Post pain: adequate analgesia    Post assessment: no apparent anesthetic complications and tolerated procedure well    Post vital signs: stable    Level of consciousness: awake, alert and oriented    Nausea/Vomiting: no nausea/vomiting    Complications: none    Transfer of care protocol was followed      Last vitals:   Visit Vitals  /87   Pulse 68   Temp 36.5 °C (97.7 °F)   Resp 15   Ht 5' 9" (1.753 m)   Wt 135.6 kg (299 lb)   SpO2 96%   Breastfeeding No   BMI 44.15 kg/m²     "

## 2023-03-20 LAB
FINAL PATHOLOGIC DIAGNOSIS: NORMAL
GROSS: NORMAL
Lab: NORMAL

## 2023-03-28 NOTE — PROGRESS NOTES
Final Pathologic Diagnosis BIOPSY OF ASCENDING COLON:   ADENOMATOUS POLYP   Comment: Interp By Liam Bentley M.D., Signed on 03/20/2023 at 12:02    Adenoma 2 mm    Repeat colonoscopy in 7 years       If you have any questions or if I can clarify any of the above please contact me:    [employer] [phone]   Pager [phone]  email [email or web address]  Nurse Apryl Copeland [phone]  :  [phone]    Sincerely  HTyrel MD, FACS, FASCRS  Staff Surgeon  Dept of Colon and Rectal Surgery

## 2023-04-12 ENCOUNTER — PATIENT MESSAGE (OUTPATIENT)
Dept: ADMINISTRATIVE | Facility: HOSPITAL | Age: 48
End: 2023-04-12
Payer: COMMERCIAL

## 2023-04-25 ENCOUNTER — PATIENT MESSAGE (OUTPATIENT)
Dept: INTERNAL MEDICINE | Facility: CLINIC | Age: 48
End: 2023-04-25
Payer: COMMERCIAL

## 2023-04-25 DIAGNOSIS — Z00.00 ANNUAL PHYSICAL EXAM: ICD-10-CM

## 2023-04-25 DIAGNOSIS — E03.9 HYPOTHYROIDISM, UNSPECIFIED TYPE: Primary | ICD-10-CM

## 2023-04-25 DIAGNOSIS — D64.9 ANEMIA, UNSPECIFIED TYPE: ICD-10-CM

## 2023-04-25 RX ORDER — LEVOTHYROXINE SODIUM 200 UG/1
TABLET ORAL
Qty: 102 TABLET | Refills: 3 | Status: SHIPPED | OUTPATIENT
Start: 2023-04-25

## 2023-04-25 NOTE — TELEPHONE ENCOUNTER
Yes, I sent in new prescription - we can check annual labs in July when due   - please help arrange labs in July through [facility] or change to [facility] depending on where they need to be completed

## 2024-01-16 ENCOUNTER — ON-DEMAND VIRTUAL (OUTPATIENT)
Dept: URGENT CARE | Facility: CLINIC | Age: 49
End: 2024-01-16
Payer: COMMERCIAL

## 2024-01-16 DIAGNOSIS — U07.1 COVID-19: Primary | ICD-10-CM

## 2024-01-16 PROCEDURE — 99202 OFFICE O/P NEW SF 15 MIN: CPT | Mod: 95,,, | Performed by: NURSE PRACTITIONER

## 2024-01-16 RX ORDER — LITHIUM CARBONATE 300 MG/1
900 TABLET, FILM COATED, EXTENDED RELEASE ORAL NIGHTLY
COMMUNITY
Start: 2023-12-30

## 2024-01-16 NOTE — PROGRESS NOTES
Subjective:      Patient ID: Noemi Murillo is a 48 y.o. female.    Vitals:  vitals were not taken for this visit.     Chief Complaint: COVID-19 Concerns      Visit Type: TELE AUDIOVISUAL    Present with the patient at the time of consultation: TELEMED PRESENT WITH PATIENT: None    Past Medical History:   Diagnosis Date    Depression     Insomnia      Past Surgical History:   Procedure Laterality Date    ADENOIDECTOMY  ?    I think they took these out with my tonsils.    COLONOSCOPY N/A 3/14/2023    Procedure: COLONOSCOPY;  Surgeon: ANNALISE Barahona MD;  Location: [address]);  Service: Endoscopy;  Laterality: N/A;  inst portal-tb  precall done/arrival time of 9:45 confirmed/mleone lpn    INCISION AND DRAINAGE OF WOUND  2012    right leg     TONSILLECTOMY      TOTAL THYROIDECTOMY  03/2021     Review of patient's allergies indicates:  No Known Allergies  Current Outpatient Medications on File Prior to Visit   Medication Sig Dispense Refill    lithium (LITHOBID) 300 MG CR tablet Take 900 mg by mouth every evening.      ARIPiprazole (ABILIFY) 5 MG Tab Take 5 mg by mouth once daily.      FLUoxetine 40 MG capsule Take 80 mg by mouth once daily.      levothyroxine (SYNTHROID) 200 MCG tablet Take 1 tab per day first in the morning by mouth on an empty stomach Monday through Saturday and take 2 tabs on Sundays 102 tablet 3    lithium (LITHOTAB) 300 mg tablet Take 900 mg by mouth every evening.      temazepam (RESTORIL) 30 mg capsule Take 30 mg by mouth nightly as needed.  0    [DISCONTINUED] cyanocobalamin, vitamin B-12, 500 mcg Subl Place 1,000 mcg under the tongue once daily. 90 tablet 3     No current facility-administered medications on file prior to visit.     Family History   Problem Relation Age of Onset    Arthritis Mother         Also, all my older relatives have arthritis.    Depression Mother     No Known Problems Father     No Known Problems Sister     Breast cancer Paternal Aunt     Cancer Paternal  Aunt 72        breast cancer    Stroke Maternal Grandmother     Hearing loss Maternal Grandfather         All my grandparents had hearing aids.    Colon cancer Neg Hx     Ovarian cancer Neg Hx            Ohs Peq Odvv Intake    1/16/2024  8:21 AM CST - Filed by Patient   What is your current physical address in the event of a medical emergency? [address]   Are you able to take your vital signs? Yes   Systolic Blood Pressure: 107   Diastolic Blood Pressure: 86   Weight: 300   Height: 69   Pulse: 82   Temperature: 99.5   Respiration rate:    Pulse Oxygen:    Please attach any relevant images or files          COVID symptoms for 2 days. Congestion, cough, and fever.         Constitution: Positive for appetite change, chills, fatigue and fever.   HENT:  Positive for congestion. Negative for ear pain and sore throat.    Respiratory:  Positive for cough. Negative for shortness of breath and wheezing.    Gastrointestinal:  Negative for nausea, vomiting and diarrhea.   Musculoskeletal:  Positive for muscle ache.        Objective:   The physical exam was conducted virtually.  Physical Exam   Constitutional: She is oriented to person, place, and time. She does not appear ill. No distress.   HENT:   Head: Normocephalic and atraumatic.   Nose: Nose normal.   Eyes: Extraocular movement intact   Pulmonary/Chest: Effort normal.   Abdominal: Normal appearance.   Musculoskeletal: Normal range of motion.         General: Normal range of motion.   Neurological: no focal deficit. She is alert and oriented to person, place, and time.   Psychiatric: Her behavior is normal. Mood normal.   Vitals reviewed.      Assessment:     1. COVID-19        Plan:   Patient encouraged to monitor symptoms closely and instructed to follow-up for new or worsening symptoms. Further, in-person, evaluation may be necessary for continued treatment. Please follow up with your primary care doctor or specialist as needed. Verbally discussed  plan. Patient confirms understanding and is in agreement with treatment and plan.     You must understand that you've received a Virtual Care evaluation only and that you may be released before all your medical problems are known or treated. You, the patient, will arrange for follow up care as instructed.      COVID-19      Patient Instructions       Antiviral medication discussed as alternate treatment. Risks and benefits discussed. Side effects discussed. Medical history reviewed with patient, and medications reviewed for interactions. Interactions found with Abilify. Dosage adjustments recommended. Patient to follow-up with PCP before starting Paxlovid. Patient has no other contraindications to taking this medication. You have 5 days from symptom onset to start the medication for maximum benefit. Recommend follow-up with Primary Care for continued care.      OVER THE COUNTER RECOMMENDATIONS/SUGGESTIONS.      ·         Make sure to stay well hydrated.     ·         Use Nasal Saline to mechanically move any post nasal drip from your eustachian tube or from the back of your throat.     ·         Use warm saltwater gargles to ease your throat pain. Warm saltwater gargles as needed for sore throat-  1/2 tsp salt to 1 cup warm water, gargle as desired.     ·         Use an antihistamine such as Claritin, Zyrtec or Allegra to dry you out.     ·         Use pseudoephedrine (behind the counter) to decongest. Pseudoephedrine  30 mg up to 240 mg /day. It can raise your blood pressure and give you palpitations.     ·         Use Mucinex (guaifenisin) to break up mucous up to 2400mg/day to loosen any mucous.     ·         The Mucinex DM pill has a cough suppressant that can be sedating. It can be used at night to stop the tickle at the back of your throat.     ·         You can use Mucinex D (it has guaifenesin and a high dose of pseudoephedrine) in the mornings to help decongest.     ·         Use Afrin (oxymetazoline) in  each nare for no longer than 3 days, as it is addictive. It can also dry out your mucous membranes and cause elevated blood pressure. This is especially useful if you are flying.     ·         Use Flonase 1-2 sprays/nostril per day. It is a local acting steroid nasal spray, if you develop a bloody nose, stop using the medication immediately.     ·         Sometimes Nyquil at night is beneficial to help you get some rest, however it is sedating, and it does have an antihistamine, and Tylenol.     ·         Honey is a natural cough suppressant that can be used.     ·         Tylenol up to 4,000 mg a day is safe for short periods and can be used for body aches, pain, and fever. However, in high doses and prolonged use it can cause liver irritation.     ·         Ibuprofen is a non-steroidal anti-inflammatory that can be used for body aches, pain, and fever. However, it can also cause stomach irritation if overused.     Here are some useful tips:     COVID-19 Self Care and Symptom Monitoring Program Now Available in [facility]     For community members seeking a COVID-19 test, we strongly recommend at-home testing, whether they are experiencing symptoms or want peace of mind knowing their COVID status. At-home tests can be purchased at major retailers and some local health departments are also giving away at-home testing kits to residents. The rapid at-home test kits are reliable, and many are the same tests [facility] utilizes in various healthcare settings.     If a patient recently tested positive for COVID-19, they can easily enroll in [facility]s free self-care and symptom monitoring program. By completing a quick form in [facility], they will be automatically registered into our COVID-19 Self Care and Symptom Monitoring Program. If symptoms worsen, our care team will be there to guide them on next steps and treatment options.     Click on this tip sheet for the COVID-19 Self Care and Symptom Monitoring Program.      Community Testing     Teams are still hosting drive-thru community testing sites throughout the system, so you can refer community members and patients to these sites as well but please note that many of these locations are administering PCR tests and results can take up to 72 hours. Dates and hours of operations for our community testing sites can be found on our website.      The Urgent cares have stopped asymptomatic testing due to the volumes of symptomatic patients - we strongly recommend at-home testing.     Patient Advice     Schedule your booster shot now to protect yourself, your friends and your family.     Assess your individual personal risk before attending any event-outdoor gatherings are best!     Wear your mask unless you are actively eating or drinking.     Wash your hands frequently, cough or sneeze into your elbow or tissue.     Following potential exposure from travel or holiday parties, test for infection 3 to 5 days later with an at home test.     International travelers should be tested regardless of symptoms, vaccination status or history of COVID-19 infection 3 to 5 days after return.     If you test positive for COVID-19 while you are traveling, view this website to learn about treatments available to you where you are     Quarantine Guidelines:     - If you tested positive and do not have symptoms, you must isolate for 5 days starting on the day of the positive test.      - If you tested positive and have symptoms, you must isolate for 5 days starting on the day of the first symptoms, not the day of the positive test. ** This is the most important part, both the CDC and the [facility] emphasize that you do not test out of isolation. **     - After 5 days, if your symptoms have improved and you have not had fever on day 5, you can return to the community on day 6- NO TESTING REQUIRED!      - In fact, we do not retest if you were positive in the last 90 days.      - After your 5 days of  isolation are completed, the CDC recommends strict mask use for the first 5 days that you come out of isolation.      CDC Testing and Quarantine Guidelines for patients with exposure to a known-positive COVID-19 person:         - A 'close exposure' is defined as anyone who has had an exposure (masked or unmasked) to a known COVID -19 positive person within 6 feet of someone for a cumulative total of 15 minutes or more over a 24-hour period.         - Vaccinated, Have been boosted or completed the primary series of Pfizer or Moderna vaccine within the last 6 months or completed the primary series of J&J vaccine within the last 2 months and/or had a positive test within 90 days:      - do NOT need to quarantine after contact with someone who had COVID-19 unless they have symptoms.      - fully vaccinated people who have not had a positive test within 90 days, should get tested 3-5 days after their exposure, even if they don't have symptoms and wear a mask indoors in public for 10 days following exposure or until their test result is negative on day 5.      - If you develop symptoms test and quarantine.      - Unvaccinated, or are more than six months out from their second mRNA dose (or more than 2 months after the J&J vaccine) and not yet boosted, and/or NOT had a positive test within 90 days and meet 'close exposure':       - you are required by CDC guidelines to quarantine for at least 5 days from time of exposure followed by 5 days of strict masking. It is recommended, but not required to test after 5 days, unless you develop symptoms, in which case you should test at that time.      - If you do decide to test at 5 days and are asymptomatic, the risk is that if you test without symptoms on Day 5 for example and you are positive, your 5 day isolation begins on that day, and you turned your 5 day quarantine into 10 days.      - If your exposure does not meet the above definition, you can return to your normal daily  activities to include social distancing, wearing a mask and frequent handwashing.      Alternatively, if a 5-day quarantine is not feasible, it is imperative that an exposed person wear a well-fitting mask at all times when around others for 10 days after exposure.      Sites for community testing     https://ldh.la.gov/index.cfm/page/3934?clearCache=1        https://www.[facility].org/testing     ·         As you know the urgent cares, emergency rooms and primary care offices are starting to get busier with the rapid rise of COVID and as such [facility] is taking walk ups for testing:      §  Check-in via [facility] account or by calling [phone] upon arrival to be checked in remotely      §  Testing Available: PCR Testing (NOT Rapid Test)      Dexamethasone/Corticosteroids     Recommendation Against the Use of Corticosteroids to Treat Outpatients with COVID-19 and Recommendations for Vaccination of Persons with Known or Previous COVID-19 Infection           https://www.psccz30mdppmqpurvieymddobu.nih.gov/management/clinical-management/nonhospitalized-adults--therapeutic-management/      ·         COVID-19 vaccination and SARS-CoV-2 infection     https://www.cdc.gov/vaccines/covid-19/clinical-considerations/covid-19-vaccines-[place].html#CoV-19-vaccination

## 2024-01-16 NOTE — PATIENT INSTRUCTIONS
Antiviral medication discussed as alternate treatment. Risks and benefits discussed. Side effects discussed. Medical history reviewed with patient, and medications reviewed for interactions. Interactions found with Abilify. Dosage adjustments recommended. Patient to follow-up with PCP before starting Paxlovid. Patient has no other contraindications to taking this medication. You have 5 days from symptom onset to start the medication for maximum benefit. Recommend follow-up with Primary Care for continued care.      OVER THE COUNTER RECOMMENDATIONS/SUGGESTIONS.      ·         Make sure to stay well hydrated.     ·         Use Nasal Saline to mechanically move any post nasal drip from your eustachian tube or from the back of your throat.     ·         Use warm saltwater gargles to ease your throat pain. Warm saltwater gargles as needed for sore throat-  1/2 tsp salt to 1 cup warm water, gargle as desired.     ·         Use an antihistamine such as Claritin, Zyrtec or Allegra to dry you out.     ·         Use pseudoephedrine (behind the counter) to decongest. Pseudoephedrine  30 mg up to 240 mg /day. It can raise your blood pressure and give you palpitations.     ·         Use Mucinex (guaifenisin) to break up mucous up to 2400mg/day to loosen any mucous.     ·         The Mucinex DM pill has a cough suppressant that can be sedating. It can be used at night to stop the tickle at the back of your throat.     ·         You can use Mucinex D (it has guaifenesin and a high dose of pseudoephedrine) in the mornings to help decongest.     ·         Use Afrin (oxymetazoline) in each nare for no longer than 3 days, as it is addictive. It can also dry out your mucous membranes and cause elevated blood pressure. This is especially useful if you are flying.     ·         Use Flonase 1-2 sprays/nostril per day. It is a local acting steroid nasal spray, if you develop a bloody nose, stop using the medication immediately.     ·          Sometimes Nyquil at night is beneficial to help you get some rest, however it is sedating, and it does have an antihistamine, and Tylenol.     ·         Honey is a natural cough suppressant that can be used.     ·         Tylenol up to 4,000 mg a day is safe for short periods and can be used for body aches, pain, and fever. However, in high doses and prolonged use it can cause liver irritation.     ·         Ibuprofen is a non-steroidal anti-inflammatory that can be used for body aches, pain, and fever. However, it can also cause stomach irritation if overused.     Here are some useful tips:     COVID-19 Self Care and Symptom Monitoring Program Now Available in [facility]     For community members seeking a COVID-19 test, we strongly recommend at-home testing, whether they are experiencing symptoms or want peace of mind knowing their COVID status. At-home tests can be purchased at major retailers and some local health departments are also giving away at-home testing kits to residents. The rapid at-home test kits are reliable, and many are the same tests [facility] utilizes in various healthcare settings.     If a patient recently tested positive for COVID-19, they can easily enroll in [facility]s free self-care and symptom monitoring program. By completing a quick form in [facility], they will be automatically registered into our COVID-19 Self Care and Symptom Monitoring Program. If symptoms worsen, our care team will be there to guide them on next steps and treatment options.     Click on this tip sheet for the COVID-19 Self Care and Symptom Monitoring Program.     Community Testing     Teams are still hosting drive-thru community testing sites throughout the system, so you can refer community members and patients to these sites as well but please note that many of these locations are administering PCR tests and results can take up to 72 hours. Dates and hours of operations for our community testing sites can be  found on our website.      The Urgent cares have stopped asymptomatic testing due to the volumes of symptomatic patients - we strongly recommend at-home testing.     Patient Advice     Schedule your booster shot now to protect yourself, your friends and your family.     Assess your individual personal risk before attending any event-outdoor gatherings are best!     Wear your mask unless you are actively eating or drinking.     Wash your hands frequently, cough or sneeze into your elbow or tissue.     Following potential exposure from travel or holiday parties, test for infection 3 to 5 days later with an at home test.     International travelers should be tested regardless of symptoms, vaccination status or history of COVID-19 infection 3 to 5 days after return.     If you test positive for COVID-19 while you are traveling, view this website to learn about treatments available to you where you are     Quarantine Guidelines:     - If you tested positive and do not have symptoms, you must isolate for 5 days starting on the day of the positive test.      - If you tested positive and have symptoms, you must isolate for 5 days starting on the day of the first symptoms, not the day of the positive test. ** This is the most important part, both the CDC and the [facility] emphasize that you do not test out of isolation. **     - After 5 days, if your symptoms have improved and you have not had fever on day 5, you can return to the community on day 6- NO TESTING REQUIRED!      - In fact, we do not retest if you were positive in the last 90 days.      - After your 5 days of isolation are completed, the CDC recommends strict mask use for the first 5 days that you come out of isolation.      CDC Testing and Quarantine Guidelines for patients with exposure to a known-positive COVID-19 person:         - A 'close exposure' is defined as anyone who has had an exposure (masked or unmasked) to a known COVID -19 positive person within 6 feet  of someone for a cumulative total of 15 minutes or more over a 24-hour period.         - Vaccinated, Have been boosted or completed the primary series of Pfizer or Moderna vaccine within the last 6 months or completed the primary series of J&J vaccine within the last 2 months and/or had a positive test within 90 days:      - do NOT need to quarantine after contact with someone who had COVID-19 unless they have symptoms.      - fully vaccinated people who have not had a positive test within 90 days, should get tested 3-5 days after their exposure, even if they don't have symptoms and wear a mask indoors in public for 10 days following exposure or until their test result is negative on day 5.      - If you develop symptoms test and quarantine.      - Unvaccinated, or are more than six months out from their second mRNA dose (or more than 2 months after the J&J vaccine) and not yet boosted, and/or NOT had a positive test within 90 days and meet 'close exposure':       - you are required by CDC guidelines to quarantine for at least 5 days from time of exposure followed by 5 days of strict masking. It is recommended, but not required to test after 5 days, unless you develop symptoms, in which case you should test at that time.      - If you do decide to test at 5 days and are asymptomatic, the risk is that if you test without symptoms on Day 5 for example and you are positive, your 5 day isolation begins on that day, and you turned your 5 day quarantine into 10 days.      - If your exposure does not meet the above definition, you can return to your normal daily activities to include social distancing, wearing a mask and frequent handwashing.      Alternatively, if a 5-day quarantine is not feasible, it is imperative that an exposed person wear a well-fitting mask at all times when around others for 10 days after exposure.      Sites for community testing     https://ldh.la.gov/index.cfm/page/8755?clearCache=1         https://www.[facility].org/testing     ·         As you know the urgent cares, emergency rooms and primary care offices are starting to get busier with the rapid rise of COVID and as such [facility] is taking walk ups for testing:      §  Check-in via [facility] account or by calling [phone] upon arrival to be checked in remotely      §  Testing Available: PCR Testing (NOT Rapid Test)      Dexamethasone/Corticosteroids     Recommendation Against the Use of Corticosteroids to Treat Outpatients with COVID-19 and Recommendations for Vaccination of Persons with Known or Previous COVID-19 Infection           https://www.tihsx86znpfymujsoptnxzqeih.nih.gov/management/clinical-management/nonhospitalized-adults--therapeutic-management/      ·         COVID-19 vaccination and SARS-CoV-2 infection     https://www.cdc.gov/vaccines/covid-19/clinical-considerations/covid-19-vaccines-[place].html#CoV-19-vaccination

## 2024-02-05 ENCOUNTER — PATIENT OUTREACH (OUTPATIENT)
Dept: ADMINISTRATIVE | Facility: HOSPITAL | Age: 49
End: 2024-02-05
Payer: COMMERCIAL

## 2024-02-05 ENCOUNTER — PATIENT MESSAGE (OUTPATIENT)
Dept: ADMINISTRATIVE | Facility: HOSPITAL | Age: 49
End: 2024-02-05
Payer: COMMERCIAL

## 2024-02-05 DIAGNOSIS — Z12.31 ENCOUNTER FOR SCREENING MAMMOGRAM FOR BREAST CANCER: Primary | ICD-10-CM

## 2024-02-12 ENCOUNTER — HOSPITAL ENCOUNTER (OUTPATIENT)
Dept: RADIOLOGY | Facility: HOSPITAL | Age: 49
Discharge: HOME | End: 2024-02-12
Attending: INTERNAL MEDICINE
Payer: COMMERCIAL

## 2024-02-12 DIAGNOSIS — Z12.31 ENCOUNTER FOR SCREENING MAMMOGRAM FOR BREAST CANCER: ICD-10-CM

## 2024-02-12 PROCEDURE — 77067 SCR MAMMO BI INCL CAD: CPT | Mod: TC

## 2024-02-12 PROCEDURE — 77067 SCR MAMMO BI INCL CAD: CPT | Mod: 26,,, | Performed by: RADIOLOGY

## 2024-02-12 PROCEDURE — 77063 BREAST TOMOSYNTHESIS BI: CPT | Mod: 26,,, | Performed by: RADIOLOGY

## 2024-02-19 ENCOUNTER — PATIENT MESSAGE (OUTPATIENT)
Dept: ADMINISTRATIVE | Facility: HOSPITAL | Age: 49
End: 2024-02-19
Payer: COMMERCIAL

## 2024-02-19 ENCOUNTER — PATIENT OUTREACH (OUTPATIENT)
Dept: ADMINISTRATIVE | Facility: HOSPITAL | Age: 49
End: 2024-02-19
Payer: COMMERCIAL

## 2024-02-19 DIAGNOSIS — Z12.4 PAP SMEAR FOR CERVICAL CANCER SCREENING: Primary | ICD-10-CM

## 2024-02-19 NOTE — PROGRESS NOTES
Checked [employer] still pending. Health Maintenance Due   Topic Date Due    Cervical Cancer Screening  01/10/2021    Influenza Vaccine (1) 09/01/2023    COVID-19 Vaccine (4 - 2023-24 season) 09/01/2023   Health Maintenance reviewed and updated and Links triggered. Last obgyn appt was cancelled,  However, Patient reached out during campaign and requested to be scheduled. Order placed and  Message sent via portal for date and time. 2/19/24 (fford)

## 2024-03-15 ENCOUNTER — PATIENT MESSAGE (OUTPATIENT)
Dept: INTERNAL MEDICINE | Facility: CLINIC | Age: 49
End: 2024-03-15
Payer: COMMERCIAL

## 2024-03-15 DIAGNOSIS — E53.8 FOLATE DEFICIENCY: ICD-10-CM

## 2024-03-15 DIAGNOSIS — E53.8 B12 DEFICIENCY: ICD-10-CM

## 2024-03-15 DIAGNOSIS — Z00.00 ANNUAL PHYSICAL EXAM: Primary | ICD-10-CM

## 2024-03-15 DIAGNOSIS — E55.9 VITAMIN D DEFICIENCY: ICD-10-CM

## 2024-03-18 ENCOUNTER — OFFICE VISIT (OUTPATIENT)
Dept: OBSTETRICS AND GYNECOLOGY | Facility: CLINIC | Age: 49
End: 2024-03-18
Payer: COMMERCIAL

## 2024-03-18 VITALS
HEIGHT: 69 IN | DIASTOLIC BLOOD PRESSURE: 85 MMHG | WEIGHT: 293 LBS | BODY MASS INDEX: 43.4 KG/M2 | SYSTOLIC BLOOD PRESSURE: 122 MMHG

## 2024-03-18 DIAGNOSIS — Z12.4 SCREENING FOR CERVICAL CANCER: ICD-10-CM

## 2024-03-18 DIAGNOSIS — Z12.4 PAP SMEAR FOR CERVICAL CANCER SCREENING: ICD-10-CM

## 2024-03-18 DIAGNOSIS — Z01.419 WELL WOMAN EXAM WITH ROUTINE GYNECOLOGICAL EXAM: Primary | ICD-10-CM

## 2024-03-18 PROCEDURE — 99999 PR PBB SHADOW E&M-EST. PATIENT-LVL III: CPT | Mod: PBBFAC,,, | Performed by: OBSTETRICS & GYNECOLOGY

## 2024-03-18 PROCEDURE — 3074F SYST BP LT 130 MM HG: CPT | Mod: CPTII,S$GLB,, | Performed by: OBSTETRICS & GYNECOLOGY

## 2024-03-18 PROCEDURE — 88175 CYTOPATH C/V AUTO FLUID REDO: CPT | Performed by: OBSTETRICS & GYNECOLOGY

## 2024-03-18 PROCEDURE — 3008F BODY MASS INDEX DOCD: CPT | Mod: CPTII,S$GLB,, | Performed by: OBSTETRICS & GYNECOLOGY

## 2024-03-18 PROCEDURE — 3079F DIAST BP 80-89 MM HG: CPT | Mod: CPTII,S$GLB,, | Performed by: OBSTETRICS & GYNECOLOGY

## 2024-03-18 PROCEDURE — 99386 PREV VISIT NEW AGE 40-64: CPT | Mod: S$GLB,,, | Performed by: OBSTETRICS & GYNECOLOGY

## 2024-03-18 PROCEDURE — 1159F MED LIST DOCD IN RCRD: CPT | Mod: CPTII,S$GLB,, | Performed by: OBSTETRICS & GYNECOLOGY

## 2024-03-18 PROCEDURE — 87624 HPV HI-RISK TYP POOLED RSLT: CPT | Performed by: OBSTETRICS & GYNECOLOGY

## 2024-03-18 NOTE — PROGRESS NOTES
"  Subjective:      Patient ID: Noemi Murillo is a 48 y.o. female.    Chief Complaint:  Well Woman      History of Present Illness  HPI  49yo  presents for annual exam.  No complaints today.  Last pap about 5 years ago, normal.  H/o one abn pap years ago, biopsies normal and all subsequent paps normal.  Reports cycles are every 1-3 months for the past year, starting to space out.  Annoying/bothersome due to their unpredictability, but declines hormonal treatment/intervention.  Occ hot flashes off and on.  Sexually active without complaints.  Up to date on MMG and colonoscopy.     GYN & OB History  Patient's last menstrual period was 2024 (approximate).   Date of Last Pap: No result found    OB History    Para Term  AB Living   2 0 0   2     SAB IAB Ectopic Multiple Live Births   1 1            # Outcome Date GA Lbr Manjeet/2nd Weight Sex Delivery Anes PTL Lv   2 IAB            1 SAB                Review of Systems  Review of Systems   Constitutional:  Negative for chills and fever.   Respiratory:  Negative for cough and shortness of breath.    Cardiovascular:  Negative for chest pain.   Gastrointestinal:  Negative for abdominal pain, constipation and diarrhea.   Genitourinary:  Positive for hot flashes. Negative for dyspareunia, vaginal discharge, vaginal dryness and vaginal odor.   Musculoskeletal:  Negative for myalgias.   Neurological:  Negative for headaches (improved after thyroidectomy).   Breast: Negative for breast self exam         Objective:     Physical Exam    /85   Ht 5' 9" (1.753 m)   Wt (!) 137 kg (302 lb 0.5 oz)   LMP 2024 (Approximate)   BMI 44.60 kg/m²     Gen: NAD  Resp: Normal respiratory effort  Breast: Symmetric, nontender.  No masses.  No skin changes.  No nipple discharge.  Abd: soft, NT  Pelvic: Normal-appearing external female genitalia.  Cervix without lesions.  Uterus and ovaries difficult to palpate, but no masses or tenderness noted.    Ext: " normal ROM  Psych: appropriate affect  Neuro: grossly intact    Assessment:     Noemi was seen today for well woman.    Diagnoses and all orders for this visit:    Well woman exam with routine gynecological exam    Pap smear for cervical cancer screening  -     Ambulatory referral/consult to Obstetrics / Gynecology    Screening for cervical cancer  -     Liquid-Based Pap Smear, Screening  -     HPV High Risk Genotypes, PCR            Plan:     Routine annual exam with pap smear and breast exam today.  Declined STD screening.  Up to date on MMG and colonoscopy.   Discussed perimenopausal phase with patient - will continue to monitor cycles, precautions given to call if they become heavier or more frequent.   Counseling done, precautions given, all questions answered.  RTC 1 year for annual, or prn.

## 2024-03-20 LAB
25(OH)D3+25(OH)D2 SERPL-MCNC: 31 NG/ML (ref 30–100)
ALBUMIN SERPL-MCNC: 4 G/DL (ref 3.6–5.1)
ALBUMIN/GLOB SERPL: 1.6 (CALC) (ref 1–2.5)
ALP SERPL-CCNC: 67 U/L (ref 31–125)
ALT SERPL-CCNC: 9 U/L (ref 6–29)
AST SERPL-CCNC: 13 U/L (ref 10–35)
BASOPHILS # BLD AUTO: 65 CELLS/UL (ref 0–200)
BASOPHILS NFR BLD AUTO: 0.6 %
BILIRUB SERPL-MCNC: 0.5 MG/DL (ref 0.2–1.2)
BUN SERPL-MCNC: 15 MG/DL (ref 7–25)
BUN/CREAT SERPL: NORMAL (CALC) (ref 6–22)
CALCIUM SERPL-MCNC: 8.9 MG/DL (ref 8.6–10.2)
CHLORIDE SERPL-SCNC: 106 MMOL/L (ref 98–110)
CHOLEST SERPL-MCNC: 181 MG/DL
CHOLEST/HDLC SERPL: 4.1 (CALC)
CO2 SERPL-SCNC: 26 MMOL/L (ref 20–32)
CREAT SERPL-MCNC: 0.64 MG/DL (ref 0.5–0.99)
EGFR: 110 ML/MIN/1.73M2
EOSINOPHIL # BLD AUTO: 436 CELLS/UL (ref 15–500)
EOSINOPHIL NFR BLD AUTO: 4 %
ERYTHROCYTE [DISTWIDTH] IN BLOOD BY AUTOMATED COUNT: 13.5 % (ref 11–15)
FOLATE SERPL-MCNC: 4.2 NG/ML
GLOBULIN SER CALC-MCNC: 2.5 G/DL (CALC) (ref 1.9–3.7)
GLUCOSE SERPL-MCNC: 80 MG/DL (ref 65–99)
HBA1C MFR BLD: 5.7 % OF TOTAL HGB
HCT VFR BLD AUTO: 36.1 % (ref 35–45)
HDLC SERPL-MCNC: 44 MG/DL
HGB BLD-MCNC: 11.4 G/DL (ref 11.7–15.5)
LDLC SERPL CALC-MCNC: 111 MG/DL (CALC)
LYMPHOCYTES # BLD AUTO: 2300 CELLS/UL (ref 850–3900)
LYMPHOCYTES NFR BLD AUTO: 21.1 %
MCH RBC QN AUTO: 27.3 PG (ref 27–33)
MCHC RBC AUTO-ENTMCNC: 31.6 G/DL (ref 32–36)
MCV RBC AUTO: 86.6 FL (ref 80–100)
MONOCYTES # BLD AUTO: 850 CELLS/UL (ref 200–950)
MONOCYTES NFR BLD AUTO: 7.8 %
NEUTROPHILS # BLD AUTO: 7249 CELLS/UL (ref 1500–7800)
NEUTROPHILS NFR BLD AUTO: 66.5 %
NONHDLC SERPL-MCNC: 137 MG/DL (CALC)
PLATELET # BLD AUTO: 292 THOUSAND/UL (ref 140–400)
PMV BLD REES-ECKER: 9.4 FL (ref 7.5–12.5)
POTASSIUM SERPL-SCNC: 4.9 MMOL/L (ref 3.5–5.3)
PROT SERPL-MCNC: 6.5 G/DL (ref 6.1–8.1)
RBC # BLD AUTO: 4.17 MILLION/UL (ref 3.8–5.1)
SODIUM SERPL-SCNC: 141 MMOL/L (ref 135–146)
TRIGL SERPL-MCNC: 148 MG/DL
TSH SERPL-ACNC: 2.07 MIU/L
VIT B12 SERPL-MCNC: 286 PG/ML (ref 200–1100)
WBC # BLD AUTO: 10.9 THOUSAND/UL (ref 3.8–10.8)

## 2024-03-22 ENCOUNTER — OFFICE VISIT (OUTPATIENT)
Dept: INTERNAL MEDICINE | Facility: CLINIC | Age: 49
End: 2024-03-22
Attending: INTERNAL MEDICINE
Payer: COMMERCIAL

## 2024-03-22 VITALS
WEIGHT: 293 LBS | HEART RATE: 72 BPM | BODY MASS INDEX: 43.4 KG/M2 | OXYGEN SATURATION: 98 % | HEIGHT: 69 IN | DIASTOLIC BLOOD PRESSURE: 84 MMHG | SYSTOLIC BLOOD PRESSURE: 118 MMHG

## 2024-03-22 DIAGNOSIS — D72.829 LEUKOCYTOSIS, UNSPECIFIED TYPE: ICD-10-CM

## 2024-03-22 DIAGNOSIS — F32.A ANXIETY AND DEPRESSION: ICD-10-CM

## 2024-03-22 DIAGNOSIS — G43.109 MIGRAINE WITH AURA AND WITHOUT STATUS MIGRAINOSUS, NOT INTRACTABLE: ICD-10-CM

## 2024-03-22 DIAGNOSIS — Z00.00 ANNUAL PHYSICAL EXAM: Primary | ICD-10-CM

## 2024-03-22 DIAGNOSIS — E53.8 FOLATE DEFICIENCY: ICD-10-CM

## 2024-03-22 DIAGNOSIS — E53.8 B12 DEFICIENCY: ICD-10-CM

## 2024-03-22 DIAGNOSIS — D64.9 ANEMIA, UNSPECIFIED TYPE: ICD-10-CM

## 2024-03-22 DIAGNOSIS — E66.01 SEVERE OBESITY (BMI >= 40): ICD-10-CM

## 2024-03-22 DIAGNOSIS — F41.9 ANXIETY AND DEPRESSION: ICD-10-CM

## 2024-03-22 DIAGNOSIS — E55.9 VITAMIN D DEFICIENCY: ICD-10-CM

## 2024-03-22 DIAGNOSIS — E03.9 HYPOTHYROIDISM, UNSPECIFIED TYPE: ICD-10-CM

## 2024-03-22 PROCEDURE — 99999 PR PBB SHADOW E&M-EST. PATIENT-LVL III: CPT | Mod: PBBFAC,,, | Performed by: INTERNAL MEDICINE

## 2024-03-22 PROCEDURE — 3074F SYST BP LT 130 MM HG: CPT | Mod: CPTII,S$GLB,, | Performed by: INTERNAL MEDICINE

## 2024-03-22 PROCEDURE — 1159F MED LIST DOCD IN RCRD: CPT | Mod: CPTII,S$GLB,, | Performed by: INTERNAL MEDICINE

## 2024-03-22 PROCEDURE — 3044F HG A1C LEVEL LT 7.0%: CPT | Mod: CPTII,S$GLB,, | Performed by: INTERNAL MEDICINE

## 2024-03-22 PROCEDURE — 3079F DIAST BP 80-89 MM HG: CPT | Mod: CPTII,S$GLB,, | Performed by: INTERNAL MEDICINE

## 2024-03-22 PROCEDURE — 3008F BODY MASS INDEX DOCD: CPT | Mod: CPTII,S$GLB,, | Performed by: INTERNAL MEDICINE

## 2024-03-22 PROCEDURE — 1160F RVW MEDS BY RX/DR IN RCRD: CPT | Mod: CPTII,S$GLB,, | Performed by: INTERNAL MEDICINE

## 2024-03-22 PROCEDURE — 99396 PREV VISIT EST AGE 40-64: CPT | Mod: S$GLB,,, | Performed by: INTERNAL MEDICINE

## 2024-03-22 RX ORDER — FOLIC ACID 1 MG/1
1 TABLET ORAL DAILY
Qty: 90 TABLET | Refills: 3 | Status: SHIPPED | OUTPATIENT
Start: 2024-03-22 | End: 2025-03-22

## 2024-03-22 RX ORDER — LANOLIN ALCOHOL/MO/W.PET/CERES
1000 CREAM (GRAM) TOPICAL DAILY
Qty: 90 TABLET | Refills: 3 | Status: SHIPPED | OUTPATIENT
Start: 2024-03-22

## 2024-03-22 RX ORDER — CHOLECALCIFEROL (VITAMIN D3) 50 MCG
2000 TABLET ORAL DAILY
Qty: 90 EACH | Refills: 3 | Status: SHIPPED | OUTPATIENT
Start: 2024-03-22

## 2024-03-22 NOTE — PROGRESS NOTES
Subjective:   Patient ID: Noemi Murillo is a 49 y.o. female  Chief complaint:   Chief Complaint   Patient presents with    Annual Exam       HPI  Pt here for annual exam     Working for [employer] over past year - 3 days at home and 2 days in office   - in cookie season in a week     B12 def, folate def:   Took suppl for a while but off of it for some time     Having menstrual periods still   No blood in stool     - microcytic anemia: intermittent periods   - low B12 278 - not vegetarian/vegan at this time     Ifg:   Started yoga in Feb on lunch break     Post op hypothyroidism, thyroid nodules:  - taking 200mcg Synthroid daily and 400mg on Sundays   Had thyroidectomy 3/2021    - followed by endocrinology outside [facility] - Dr. Guzman at [facility]    Migraines, Tension Headaches:  - migraines resolved   - dehydration is trigger and avoiding this - will take asa with water if needed   - improved at last VV - unable to afford MRI - discussed can revisit in future if sx change  Previously:  - occurring at left frontal ha and feels like stabbing at left eye   - reports has been waking with ha with severe ha and will get nausea and diarrhea when pain severe - 5-6 times per month over past 6 months which is new from piror HA hx  - will take zofran prn when sx cause nausea and vomiting - intermittently helpful   - imitrex 50mg can help but not when emesis as difficulty with keeping med down   - will sit in shower for 1 hour which at times can be helpful   - due for eye exam  Previously: tried   Taking magnesium oxide    Depression and anxiety:   - followed by psychiatry   - mood improved   - taking fluoxetine 80, lithium 900mg, abilify 5mg   - taking restoril qhs prn insomnia     Vit d def:   Previously was taking vit d 5k - none recently   Level in good range along with pth level in message attachment     Raspy voice over past few years   Clear mucous   Sinus congestion     + oj and coffee   Occ spicy   Seasonal  "allx   Had covid earlier this year - mid jan - mild     HM:  Flu  Covid booster     Review of Systems    Objective:  Vitals:    03/22/24 0730   BP: 118/84   BP Location: Left arm   Patient Position: Sitting   Pulse: 72   SpO2: 98%   Weight: (!) 138.5 kg (305 lb 5.4 oz)   Height: 5' 9" (1.753 m)     Body mass index is 45.09 kg/m².    Physical Exam  Vitals reviewed.   Constitutional:       Appearance: Normal appearance. She is well-developed.   HENT:      Head: Normocephalic and atraumatic.      Right Ear: Tympanic membrane, ear canal and external ear normal.      Left Ear: Tympanic membrane, ear canal and external ear normal.      Nose: Nose normal. No congestion.      Mouth/Throat:      Mouth: Mucous membranes are moist.      Pharynx: Oropharynx is clear. No oropharyngeal exudate.   Eyes:      Extraocular Movements: Extraocular movements intact.      Conjunctiva/sclera: Conjunctivae normal.      Pupils: Pupils are equal, round, and reactive to light.   Neck:      Thyroid: No thyromegaly.   Cardiovascular:      Rate and Rhythm: Normal rate and regular rhythm.      Pulses: Normal pulses.      Heart sounds: Normal heart sounds.   Pulmonary:      Effort: Pulmonary effort is normal.      Breath sounds: Normal breath sounds.   Abdominal:      General: Bowel sounds are normal.      Palpations: Abdomen is soft.   Musculoskeletal:         General: No swelling or tenderness.      Cervical back: Normal range of motion and neck supple.   Lymphadenopathy:      Cervical: No cervical adenopathy.   Skin:     General: Skin is warm and dry.      Capillary Refill: Capillary refill takes less than 2 seconds.      Nails: There is no clubbing.   Neurological:      General: No focal deficit present.      Mental Status: She is alert and oriented to person, place, and time. Mental status is at baseline.      Coordination: Coordination normal.      Gait: Gait normal.   Psychiatric:         Speech: Speech normal.         Behavior: Behavior " normal.         Thought Content: Thought content normal.       Assessment:  1. Annual physical exam    2. Leukocytosis, unspecified type    3. Folate deficiency    4. B12 deficiency    5. Anemia, unspecified type    6. Vitamin D deficiency    7. Anxiety and depression    8. Severe obesity (BMI >= 40)    9. Hypothyroidism, unspecified type    10. Migraine with aura and without status migrainosus, not intractable      Plan:  Annual physical exam    Leukocytosis, unspecified type  -     CBC Auto Differential; Future; Expected date: 04/22/2024    Folate deficiency  -     FOLATE; Future; Expected date: 04/22/2024  -     folic acid (FOLVITE) 1 MG tablet; Take 1 tablet (1 mg total) by mouth once daily.  Dispense: 90 tablet; Refill: 3    B12 deficiency  -     Vitamin B12; Future; Expected date: 04/22/2024  -     cyanocobalamin (VITAMIN B-12) 1000 MCG tablet; Take 1 tablet (1,000 mcg total) by mouth once daily.  Dispense: 90 tablet; Refill: 3    Anemia, unspecified type  -     FERRITIN; Future; Expected date: 04/22/2024  -     IRON AND TIBC; Future; Expected date: 04/22/2024    Vitamin D deficiency  -     cholecalciferol, vitamin D3, (VITAMIN D3) 50 mcg (2,000 unit) Tab; Take 1 tablet (2,000 Units total) by mouth once daily.  Dispense: 90 each; Refill: 3    Anxiety and depression    Severe obesity (BMI >= 40)  - cont diet and exercise  - increase intensity and duration of CV exercise to continue weight loss  - goal wt loss one pound per week  - portion control, healthy choices    Migraine with aura and without status migrainosus, not intractable    Recommend daily sunscreen, cardiovascular exercise min 30 min 5 days per week. Seatbelts routinely.  Check approp labs   Reviewed rec    Annual gyn     Today:   Flonase and antihistamine - if not improved in a few weeks will start trial of otc pepcid 20 x 4 weeks and monitor for improvement      Health Maintenance   Topic Date Due    Mammogram  02/12/2025    Lipid Panel   03/19/2029    Colorectal Cancer Screening  03/14/2030    TETANUS VACCINE  04/07/2031    Hepatitis C Screening  Completed

## 2024-03-23 LAB
CLINICAL INFO: NORMAL
CYTO CVX: NORMAL
CYTOLOGIST CVX/VAG CYTO: NORMAL
CYTOLOGIST CVX/VAG CYTO: NORMAL
CYTOLOGY CMNT CVX/VAG CYTO-IMP: NORMAL
CYTOLOGY PAP THIN PREP EXPLANATION: NORMAL
DATE OF PREVIOUS PAP: NORMAL
DATE PREVIOUS BX: YES
GEN CATEG CVX/VAG CYTO-IMP: NORMAL
HPV I/H RISK 4 DNA CVX QL NAA+PROBE: NOT DETECTED
LMP START DATE: NORMAL
MICROORGANISM CVX/VAG CYTO: NORMAL
PATHOLOGIST CVX/VAG CYTO: NORMAL
SERVICE CMNT-IMP: NORMAL
SPECIMEN SOURCE CVX/VAG CYTO: NORMAL
STAT OF ADQ CVX/VAG CYTO-IMP: NORMAL

## 2024-04-06 PROBLEM — E55.9 VITAMIN D DEFICIENCY: Status: ACTIVE | Noted: 2024-04-06

## 2024-04-06 PROBLEM — E53.8 FOLATE DEFICIENCY: Status: ACTIVE | Noted: 2024-04-06

## 2024-08-02 DIAGNOSIS — E03.9 HYPOTHYROIDISM, UNSPECIFIED TYPE: ICD-10-CM

## 2024-08-02 RX ORDER — LEVOTHYROXINE SODIUM 200 UG/1
TABLET ORAL
Qty: 102 TABLET | Refills: 2 | Status: SHIPPED | OUTPATIENT
Start: 2024-08-02

## 2024-08-02 NOTE — TELEPHONE ENCOUNTER
No care due was identified.  [facility] Embedded Care Due Messages. Reference number: [id number].   8/02/2024 9:24:12 AM CDT

## 2024-08-03 NOTE — TELEPHONE ENCOUNTER
Refill Decision Note   Noemi Chidi  is requesting a refill authorization.  Brief Assessment and Rationale for Refill:  Approve     Medication Therapy Plan:         Comments:     Note composed:11:21 PM 08/02/2024

## 2024-10-15 ENCOUNTER — PATIENT MESSAGE (OUTPATIENT)
Dept: INTERNAL MEDICINE | Facility: CLINIC | Age: 49
End: 2024-10-15

## 2024-10-15 ENCOUNTER — OFFICE VISIT (OUTPATIENT)
Dept: INTERNAL MEDICINE | Facility: CLINIC | Age: 49
End: 2024-10-15
Payer: COMMERCIAL

## 2024-10-15 ENCOUNTER — NURSE TRIAGE (OUTPATIENT)
Dept: ADMINISTRATIVE | Facility: CLINIC | Age: 49
End: 2024-10-15
Payer: COMMERCIAL

## 2024-10-15 VITALS
WEIGHT: 293 LBS | HEIGHT: 69 IN | SYSTOLIC BLOOD PRESSURE: 136 MMHG | BODY MASS INDEX: 43.4 KG/M2 | DIASTOLIC BLOOD PRESSURE: 93 MMHG

## 2024-10-15 DIAGNOSIS — M25.561 ACUTE PAIN OF RIGHT KNEE: Primary | ICD-10-CM

## 2024-10-15 NOTE — PROGRESS NOTES
Telehealth Visit  [facility]      The patient location is: Home in [place]   The chief complaint leading to consultation is: Knee pain  Visit type: audiovisual    Face to Face time with patient: 30 minutes      HPI: Noemi Murillo is a 49 y.o. year old female who presents for evaluation after fall    She fell on a cement sidewalk last Wednesday, injuring her right knee. She reports limited range of motion, unable to bend the knee past 90 degrees or fully straighten it however patient states that she is able to move her extremity a little bit more today without too much increased pain. She describes intense bruising and severe pain in the affected area. She has been managing symptoms at home with ice and elevation, and has attempted to use Tylenol for pain relief, which has been moderately effective. She mentions a history of previous knee surgery, as evidenced by a scar on the knee. She denies being on any anticoagulants. She has a history of knee surgery.     Physical Exam:   Gen- NAD, appears stated age  Resp- Breathing comfortably on RA  Neuro- Alert, spontaneous movements  Psych- Calm, cooperative, logical thought process  MSK- Right leg with multiple bruises to calf area. Moderate swelling to medial aspect of knee joint space     Assessment and plan:      Assessed patient's knee injury from recent fall, noting significant bruising and swelling   Suspected patellar injury with effusion based on visual exam however unable to dx ligament/meniscal tear without in-person exam  Determined continued conservative treatment appropriate at this time, given the recent nature of the injury     Noemi was seen today for fall.    Diagnoses and all orders for this visit:    Acute pain of right knee    - Explained RICE therapy (Rest, Ice, Compression, Elevation) for knee injury management.   - Discussed the importance of elevation in reducing swelling.   - Advised on the use of pillows to prop up  the leg while working at a desk.   - PRN ibuprofen and PRN tylenol for pain interchangeably  - Continue to monitor for symptom resolution throughout this week  - Follow up in-person primary care or urgent care if symptoms persist or worsen.   - ER precautions given if severe swelling, worsened inability to move foot, or fever develops.      Follow-up: PRN    30 minutes of total time spent on the encounter, which includes face to face time and non-face to face time preparing to see the patient (eg, review of tests), Obtaining and/or reviewing separately obtained history, Documenting clinical information in the electronic or other health record, Independently interpreting results (not separately reported) and communicating results to the patient/family/caregiver, or Care coordination (not separately reported).     This note was generated with the assistance of ambient listening technology. Verbal consent was obtained by the patient and accompanying visitor(s) for the recording of patient appointment to facilitate this note. I attest to having reviewed and edited the generated note for accuracy, though some syntax or spelling errors may persist. Please contact the author of this note for any clarification.      Evan Resendiz MD  [facility]

## 2024-10-15 NOTE — TELEPHONE ENCOUNTER
Pt called in stating that tripped last Wednesday- injured right knee and has calf bruise. States she was walking and tripped landing on hard ground on her right knee. States swelling and bruising are not improving. States she can bear weight but it is painful. States she is unable to bend knee past 90 degrees thinks possibly due to the swelling. Has not been seen for injury. States taking tylenol for pain and gets some mild relief. Pain 6-7/10 at its worst. Care advice per protocol. Assisted pt to schedule VV today in clinic. Advised to monitor and to call back with concerns or worsening symptoms. Verbalized understanding.     Reason for Disposition   Large swelling or bruise (> 2 inches or 5 cm)    Additional Information   Negative: Major bleeding (actively dripping or spurting) that can't be stopped   Negative: Bullet, stabbed by knife or other serious penetrating wound   Negative: Looks like a dislocated joint (crooked or deformed)   Negative: Serious injury with multiple fractures (broken bones)   Negative: Sounds like a life-threatening emergency to the triager   Negative: Can't stand (bear weight) or walk   Negative: Skin is split open or gaping (length > 1/2 inch or 12 mm)   Negative: Bleeding won't stop after 10 minutes of direct pressure (using correct technique)   Negative: Dirt in the wound and not removed after 15 minutes of scrubbing   Negative: New numbness (loss of sensation) or weakness of foot or toe(s) and present now   Negative: Sounds like a serious injury to the triager   Negative: SEVERE pain (e.g., excruciating)   Negative: No prior tetanus shots (or is not fully vaccinated) and any wound (e.g., cut or scrape)   Negative: HIV positive or severe immunodeficiency (severely weak immune system) and DIRTY cut or scrape   Negative: Patient wants to be seen   Negative: MODERATE pain (e.g., interferes with normal activities, limping) and high-risk adult (e.g., age > 60 years, osteoporosis, chronic  steroid use)   Negative: A 'snap' or 'pop' was heard at the time of injury    Protocols used: Knee Injury-A-[place]

## 2024-10-24 ENCOUNTER — OFFICE VISIT (OUTPATIENT)
Dept: INTERNAL MEDICINE | Facility: CLINIC | Age: 49
End: 2024-10-24
Payer: COMMERCIAL

## 2024-10-24 ENCOUNTER — HOSPITAL ENCOUNTER (OUTPATIENT)
Dept: RADIOLOGY | Facility: OTHER | Age: 49
Discharge: HOME | End: 2024-10-24
Payer: COMMERCIAL

## 2024-10-24 VITALS
DIASTOLIC BLOOD PRESSURE: 82 MMHG | SYSTOLIC BLOOD PRESSURE: 138 MMHG | HEIGHT: 69 IN | BODY MASS INDEX: 43.4 KG/M2 | WEIGHT: 293 LBS | HEART RATE: 74 BPM | OXYGEN SATURATION: 98 %

## 2024-10-24 DIAGNOSIS — M79.604 RIGHT LEG PAIN: ICD-10-CM

## 2024-10-24 DIAGNOSIS — S89.91XD INJURY OF RIGHT LOWER EXTREMITY, SUBSEQUENT ENCOUNTER: ICD-10-CM

## 2024-10-24 DIAGNOSIS — M79.604 RIGHT LEG PAIN: Primary | ICD-10-CM

## 2024-10-24 PROCEDURE — 1159F MED LIST DOCD IN RCRD: CPT | Mod: CPTII,S$GLB,,

## 2024-10-24 PROCEDURE — 3079F DIAST BP 80-89 MM HG: CPT | Mod: CPTII,S$GLB,,

## 2024-10-24 PROCEDURE — 73590 X-RAY EXAM OF LOWER LEG: CPT | Mod: TC,FY,RT

## 2024-10-24 PROCEDURE — 3044F HG A1C LEVEL LT 7.0%: CPT | Mod: CPTII,S$GLB,,

## 2024-10-24 PROCEDURE — 3008F BODY MASS INDEX DOCD: CPT | Mod: CPTII,S$GLB,,

## 2024-10-24 PROCEDURE — 99213 OFFICE O/P EST LOW 20 MIN: CPT | Mod: S$GLB,,,

## 2024-10-24 PROCEDURE — 99999 PR PBB SHADOW E&M-EST. PATIENT-LVL III: CPT | Mod: PBBFAC,,,

## 2024-10-24 PROCEDURE — 3075F SYST BP GE 130 - 139MM HG: CPT | Mod: CPTII,S$GLB,,

## 2024-10-24 RX ORDER — TRAMADOL HYDROCHLORIDE 50 MG/1
50 TABLET ORAL EVERY 8 HOURS PRN
Qty: 21 TABLET | Refills: 0 | Status: SHIPPED | OUTPATIENT
Start: 2024-10-24

## 2024-10-24 NOTE — PROGRESS NOTES
"    CHIEF COMPLAINT     Chief Complaint   Patient presents with    Leg Injury       HPI     Noemi Murillo is a 49 y.o. female who presents for xxx today.    PCP is Ebony Azul MD, patient is new to me. Pt consents to AI recording of visit for documentation purposes.     History of Present Illness    CHIEF COMPLAINT:  Noemi presents today for leg injury.    HISTORY OF PRESENT ILLNESS:  She tripped on a sidewalk two weeks ago, slamming the front of her leg against concrete. Since then, she reports persistent swelling, pain, bruising, and limited range of motion in the affected leg. She describes slight instability, stating the leg feels "a little wobbly." She can bear weight on the injured leg. A previous virtual doctor visit advised ibuprofen and RICE (Rest, Ice, Compression, Elevation) method, but no imaging studies were ordered. The current injury has exacerbated discomfort in an area with a significant scar from necrotizing fasciitis 12 years ago, where she experiences intense pain possibly due to scar tissue. She reports a history of similar injuries. Her current work environment allows her to sit most of the time, which she finds beneficial as extended walking causes soreness.    MEDICAL HISTORY:  History of necrotizing fasciitis 12 years ago, resulting in a significant leg scar.    MEDICATION HISTORY:  Reports adverse reaction to Percocet, specifically nausea and vomiting, and expresses preference to avoid this medication.      ROS:  General: -fever, -chills, -fatigue, -weight gain, -weight loss  Eyes: -vision changes, -redness, -discharge  ENT: -ear pain, -nasal congestion, -sore throat  Cardiovascular: -chest pain, -palpitations, -lower extremity edema  Respiratory: -cough, -shortness of breath  Gastrointestinal: -abdominal pain, +nausea, +vomiting, -diarrhea, -constipation, -blood in stool  Genitourinary: -dysuria, -hematuria, -frequency  Musculoskeletal: +joint pain, +muscle pain  Skin: " -rash, -lesion  Neurological: -headache, -dizziness, -numbness, -tingling  Psychiatric: -anxiety, -depression, -sleep difficulty          Past Medical History:  Past Medical History:   Diagnosis Date    Depression     Insomnia        Home Medications:  Prior to Admission medications    Medication Sig Start Date End Date Taking? Authorizing Provider   ARIPiprazole (ABILIFY) 5 MG Tab Take 5 mg by mouth once daily. 10/26/22  Yes Provider, Historical   cholecalciferol, vitamin D3, (VITAMIN D3) 50 mcg (2,000 unit) Tab Take 1 tablet (2,000 Units total) by mouth once daily. 3/22/24  Yes Ebony Azul MD   cyanocobalamin (VITAMIN B-12) 1000 MCG tablet Take 1 tablet (1,000 mcg total) by mouth once daily. 3/22/24  Yes Ebony Azul MD   FLUoxetine 40 MG capsule Take 80 mg by mouth once daily. 2/4/20  Yes Provider, Historical   folic acid (FOLVITE) 1 MG tablet Take 1 tablet (1 mg total) by mouth once daily. 3/22/24 3/22/25 Yes Ebony Azul MD   levothyroxine (SYNTHROID) 200 MCG tablet Take 1 tab per day first in the morning by mouth on an empty stomach Monday through Saturday and take 2 tabs on Sundays 8/2/24  Yes Ebony Azul MD   lithium (LITHOBID) 300 MG CR tablet Take 900 mg by mouth every evening. 12/30/23  Yes Provider, Historical   lithium (LITHOTAB) 300 mg tablet Take 900 mg by mouth every evening. 2/4/20  Yes Provider, Historical   temazepam (RESTORIL) 30 mg capsule Take 30 mg by mouth nightly as needed.  Patient not taking: Reported on 10/24/2024 9/5/19   Provider, Historical   traMADoL (ULTRAM) 50 mg tablet Take 1 tablet (50 mg total) by mouth every 8 (eight) hours as needed for Pain. 10/24/24   Willam Kumar NP       Review of Systems:  Review of Systems   Constitutional: Negative.    Respiratory: Negative.     Cardiovascular: Negative.    Musculoskeletal:  Positive for arthralgias.       Health Maintainence:   Immunizations:  Health Maintenance         Date Due Completion  "Date    Influenza Vaccine (1) 09/01/2024 11/16/2022    COVID-19 Vaccine (5 - 2024-25 season) 09/01/2024 12/2/2022    Mammogram 02/12/2025 2/12/2024    Hemoglobin A1c (Diabetic Prevention Screening) 03/19/2027 3/19/2024    Cervical Cancer Screening 03/18/2029 3/18/2024    Lipid Panel 03/19/2029 3/19/2024    Colorectal Cancer Screening 03/14/2030 3/14/2023    TETANUS VACCINE 04/07/2031 4/7/2021    Override on 9/8/2010: Done    RSV Vaccine (Age 60+ and Pregnant patients) (1 - 1-dose 75+ series) 03/31/2051 ---             PHYSICAL EXAM     /82   Pulse 74   Ht 5' 9" (1.753 m)   Wt (!) 143 kg (315 lb 4.1 oz)   SpO2 98%   BMI 46.56 kg/m²     Physical Exam  Vitals reviewed.   Constitutional:       Appearance: She is well-developed.   HENT:      Head: Normocephalic and atraumatic.   Eyes:      Conjunctiva/sclera: Conjunctivae normal.   Cardiovascular:      Rate and Rhythm: Normal rate.   Pulmonary:      Effort: Pulmonary effort is normal. No respiratory distress.   Musculoskeletal:      Right lower leg: Tenderness present.        Legs:       Comments: Bruising and tenderness   Skin:     General: Skin is warm and dry.      Findings: No rash.   Neurological:      Mental Status: She is alert and oriented to person, place, and time.      Coordination: Coordination normal.   Psychiatric:         Behavior: Behavior normal.         LABS     Lab Results   Component Value Date    HGBA1C 5.7 (H) 03/19/2024     CMP  Sodium   Date Value Ref Range Status   03/19/2024 141 135 - 146 mmol/L Final     Potassium   Date Value Ref Range Status   03/19/2024 4.9 3.5 - 5.3 mmol/L Final     Chloride   Date Value Ref Range Status   03/19/2024 106 98 - 110 mmol/L Final     CO2   Date Value Ref Range Status   03/19/2024 26 20 - 32 mmol/L Final     Glucose   Date Value Ref Range Status   03/19/2024 80 65 - 99 mg/dL Final     Comment:                   Fasting reference interval          BUN   Date Value Ref Range Status   03/19/2024 15 7 - " 25 mg/dL Final     Creatinine   Date Value Ref Range Status   03/19/2024 0.64 0.50 - 0.99 mg/dL Final     Calcium   Date Value Ref Range Status   03/19/2024 8.9 8.6 - 10.2 mg/dL Final     Total Protein   Date Value Ref Range Status   03/19/2024 6.5 6.1 - 8.1 g/dL Final     Albumin   Date Value Ref Range Status   03/19/2024 4.0 3.6 - 5.1 g/dL Final     Total Bilirubin   Date Value Ref Range Status   03/19/2024 0.5 0.2 - 1.2 mg/dL Final     Alkaline Phosphatase   Date Value Ref Range Status   03/08/2020 68 38 - 126 U/L Final     AST   Date Value Ref Range Status   03/19/2024 13 10 - 35 U/L Final     ALT   Date Value Ref Range Status   03/19/2024 9 6 - 29 U/L Final     Anion Gap   Date Value Ref Range Status   03/08/2020 10 8 - 16 mmol/L Final     eGFR if    Date Value Ref Range Status   03/08/2020 >60.0 >60 mL/min/1.73 m^2 Final     eGFR if non    Date Value Ref Range Status   03/08/2020 >60.0 >60 mL/min/1.73 m^2 Final     Comment:     Calculation used to obtain the estimated glomerular filtration  rate (eGFR) is the CKD-EPI equation.        Lab Results   Component Value Date    WBC 10.9 (H) 03/19/2024    HGB 11.4 (L) 03/19/2024    HCT 36.1 03/19/2024    MCV 86.6 03/19/2024     03/19/2024     Lab Results   Component Value Date    CHOL 181 03/19/2024    CHOL 151 10/31/2018     Lab Results   Component Value Date    HDL 44 (L) 03/19/2024    HDL 37 (L) 10/31/2018     Lab Results   Component Value Date    LDLCALC 111 (H) 03/19/2024    LDLCALC 96 10/31/2018     Lab Results   Component Value Date    TRIG 148 03/19/2024    TRIG 89 10/31/2018     Lab Results   Component Value Date    CHOLHDL 4.1 03/19/2024    CHOLHDL 4.1 10/31/2018     Lab Results   Component Value Date    TSH 2.07 03/19/2024       ASSESSMENT/PLAN   Assessment & Plan    Assessed leg injury from fall 2 weeks ago, noting significant bruising and swelling  Determined knee stability through physical exam  Considered  possibility of hairline fracture, but deemed unlikely due to patient's ability to bear weight and time elapsed since injury  Will order x-ray for confirmation and to rule out fracture  Opted for tramadol for pain management, considering it a milder opioid option with fewer side effects than alternatives like Percocet    LEG INJURY:  - Discussed that complete healing may take 4-6 weeks, but should see drastic improvement day over day.  - Noemi to continue rest, ice, compression, and elevation (RICE) protocol.  - Noemi to ice the injured area 2-3 times daily.  - Noemi to increase activity as tolerated.  - X-ray of injured leg ordered.  - Follow up after receiving x-ray results via patient portal.  - Contact the office if condition stops improving.  - Contact the office if x-ray shows fracture for potential referral to orthopedics.    PAIN MANAGEMENT:  - Explained that tramadol is an opioid narcotic pain medication, but milder than options like Percocet or hydrocodone.  - Started tramadol: 1-2 tablets as needed, up to 3 times daily for 1 week.  - Continued ibuprofen: take during the day for pain control.  - Noemi to avoid drinking or driving while taking tramadol.          Noemi was seen today for leg injury.    Diagnoses and all orders for this visit:    Right leg pain  -     traMADoL (ULTRAM) 50 mg tablet; Take 1 tablet (50 mg total) by mouth every 8 (eight) hours as needed for Pain.  -     X-Ray Tibia Fibula 2 View Right; Future    Injury of right lower extremity, subsequent encounter  -     traMADoL (ULTRAM) 50 mg tablet; Take 1 tablet (50 mg total) by mouth every 8 (eight) hours as needed for Pain.  -     X-Ray Tibia Fibula 2 View Right; Future          Willam Kumar NP   Department of Internal Medicine - [facility]  11:08 AM

## 2025-04-01 ENCOUNTER — HOSPITAL ENCOUNTER (OUTPATIENT)
Dept: RADIOLOGY | Facility: HOSPITAL | Age: 50
Discharge: HOME | End: 2025-04-01
Attending: INTERNAL MEDICINE
Payer: COMMERCIAL

## 2025-04-01 DIAGNOSIS — Z12.31 ENCOUNTER FOR SCREENING MAMMOGRAM FOR BREAST CANCER: ICD-10-CM

## 2025-04-01 PROCEDURE — 77063 BREAST TOMOSYNTHESIS BI: CPT | Mod: TC

## 2025-04-01 PROCEDURE — 77067 SCR MAMMO BI INCL CAD: CPT | Mod: 26,,, | Performed by: RADIOLOGY

## 2025-04-01 PROCEDURE — 77063 BREAST TOMOSYNTHESIS BI: CPT | Mod: 26,,, | Performed by: RADIOLOGY

## 2025-04-02 ENCOUNTER — RESULTS FOLLOW-UP (OUTPATIENT)
Dept: INTERNAL MEDICINE | Facility: CLINIC | Age: 50
End: 2025-04-02

## 2025-07-03 DIAGNOSIS — E03.9 HYPOTHYROIDISM, UNSPECIFIED TYPE: ICD-10-CM

## 2025-07-03 RX ORDER — LEVOTHYROXINE SODIUM 200 UG/1
TABLET ORAL
Qty: 34 TABLET | Refills: 0 | Status: SHIPPED | OUTPATIENT
Start: 2025-07-03

## 2025-07-03 NOTE — TELEPHONE ENCOUNTER
No care due was identified.  [facility] Embedded Care Due Messages. Reference number: [id number].   7/03/2025 12:49:24 PM CDT

## 2025-07-03 NOTE — TELEPHONE ENCOUNTER
Refill Encounter    PCP Visits: Recent Visits  Date Type Provider Dept   10/24/24 Office Visit Willam Kumar NP [facility]   10/15/24 Office Visit Evan Resendiz MD [facility]   Showing recent visits within past 360 days and meeting all other requirements  Future Appointments  Date Type Provider Dept   08/19/25 Appointment Ebony Azul MD [facility]   Showing future appointments within next 720 days and meeting all other requirements      Last 3 Blood Pressure:   BP Readings from Last 3 Encounters:   10/24/24 138/82   10/15/24 (!) 136/93   03/22/24 118/84     Preferred Pharmacy:   [facility] - [place], [place] - [address]  [address]  [facility] [address]  Phone: [phone] Fax: [phone]    Requested RX:  Requested Prescriptions     Pending Prescriptions Disp Refills    levothyroxine (SYNTHROID) 200 MCG tablet [Pharmacy Med Name: Levothyroxine Sodium 200mcg Tablet] 34 tablet 0     Sig: Take 1 tablet by mouth daily first in the morning on an empty stomach, Monday through Saturday and take 2 tablets by mouth on Sundays.      RX Route: Normal     oriented to person, place, time and situation

## 2025-08-06 DIAGNOSIS — E03.9 HYPOTHYROIDISM, UNSPECIFIED TYPE: ICD-10-CM

## 2025-08-06 RX ORDER — LEVOTHYROXINE SODIUM 200 UG/1
TABLET ORAL
Qty: 34 TABLET | Refills: 0 | Status: SHIPPED | OUTPATIENT
Start: 2025-08-06

## 2025-08-06 NOTE — TELEPHONE ENCOUNTER
Refill Encounter  Allergies: Confirmed    PCP Visits:   Recent Visits  No visits were found meeting these conditions.  Showing recent visits within past 360 days and meeting all other requirements  Future Appointments  Date Type Provider Dept   08/19/25 Appointment Ebony Azul MD [facility]   Showing future appointments within next 720 days and meeting all other requirements     Last 3 Blood Pressure:   BP Readings from Last 3 Encounters:   10/24/24 138/82   10/15/24 (!) 136/93   03/22/24 118/84     Preferred Pharmacy:   [email or web address] - [place], [place] - [address]  [address]  [facility] [address]  Phone: [phone] Fax: [phone]    Requested RX:  Requested Prescriptions     Pending Prescriptions Disp Refills    levothyroxine (SYNTHROID) 200 MCG tablet [Pharmacy Med Name: Levothyroxine Sodium 200mcg Tablet] 34 tablet 0     Sig: Take 1 tablet by mouth daily first in the morning on an empty stomach, Monday through Saturday and take 2 tablets by mouth on Sundays.      RX Route: Normal

## 2025-08-15 ENCOUNTER — PATIENT MESSAGE (OUTPATIENT)
Dept: INTERNAL MEDICINE | Facility: CLINIC | Age: 50
End: 2025-08-15
Payer: COMMERCIAL

## 2025-08-19 ENCOUNTER — OFFICE VISIT (OUTPATIENT)
Dept: INTERNAL MEDICINE | Facility: CLINIC | Age: 50
End: 2025-08-19
Attending: INTERNAL MEDICINE
Payer: COMMERCIAL

## 2025-08-19 VITALS
HEART RATE: 79 BPM | SYSTOLIC BLOOD PRESSURE: 130 MMHG | OXYGEN SATURATION: 98 % | HEIGHT: 69 IN | WEIGHT: 293 LBS | DIASTOLIC BLOOD PRESSURE: 76 MMHG | BODY MASS INDEX: 43.4 KG/M2

## 2025-08-19 DIAGNOSIS — Z00.00 ANNUAL PHYSICAL EXAM: Primary | ICD-10-CM

## 2025-08-19 DIAGNOSIS — G89.29 CHRONIC RIGHT SHOULDER PAIN: ICD-10-CM

## 2025-08-19 DIAGNOSIS — E53.8 LOW SERUM VITAMIN B12: ICD-10-CM

## 2025-08-19 DIAGNOSIS — M25.511 CHRONIC RIGHT SHOULDER PAIN: ICD-10-CM

## 2025-08-19 DIAGNOSIS — E55.9 VITAMIN D DEFICIENCY: ICD-10-CM

## 2025-08-19 DIAGNOSIS — M75.01 ADHESIVE CAPSULITIS OF RIGHT SHOULDER: ICD-10-CM

## 2025-08-19 DIAGNOSIS — Z78.9 VEGAN DIET: ICD-10-CM

## 2025-08-19 DIAGNOSIS — E53.8 FOLATE DEFICIENCY: ICD-10-CM

## 2025-08-19 DIAGNOSIS — E78.5 HYPERLIPIDEMIA, UNSPECIFIED HYPERLIPIDEMIA TYPE: ICD-10-CM

## 2025-08-19 DIAGNOSIS — E03.9 HYPOTHYROIDISM, UNSPECIFIED TYPE: ICD-10-CM

## 2025-08-19 DIAGNOSIS — F41.9 ANXIETY AND DEPRESSION: ICD-10-CM

## 2025-08-19 DIAGNOSIS — F32.A ANXIETY AND DEPRESSION: ICD-10-CM

## 2025-08-19 PROCEDURE — 1160F RVW MEDS BY RX/DR IN RCRD: CPT | Mod: CPTII,S$GLB,, | Performed by: INTERNAL MEDICINE

## 2025-08-19 PROCEDURE — 3044F HG A1C LEVEL LT 7.0%: CPT | Mod: CPTII,S$GLB,, | Performed by: INTERNAL MEDICINE

## 2025-08-19 PROCEDURE — 3075F SYST BP GE 130 - 139MM HG: CPT | Mod: CPTII,S$GLB,, | Performed by: INTERNAL MEDICINE

## 2025-08-19 PROCEDURE — 1159F MED LIST DOCD IN RCRD: CPT | Mod: CPTII,S$GLB,, | Performed by: INTERNAL MEDICINE

## 2025-08-19 PROCEDURE — 3008F BODY MASS INDEX DOCD: CPT | Mod: CPTII,S$GLB,, | Performed by: INTERNAL MEDICINE

## 2025-08-19 PROCEDURE — 99999 PR PBB SHADOW E&M-EST. PATIENT-LVL III: CPT | Mod: PBBFAC,,, | Performed by: INTERNAL MEDICINE

## 2025-08-19 PROCEDURE — 3078F DIAST BP <80 MM HG: CPT | Mod: CPTII,S$GLB,, | Performed by: INTERNAL MEDICINE

## 2025-08-19 PROCEDURE — 99396 PREV VISIT EST AGE 40-64: CPT | Mod: S$GLB,,, | Performed by: INTERNAL MEDICINE

## 2025-08-19 RX ORDER — MELOXICAM 15 MG/1
15 TABLET ORAL DAILY
Qty: 30 TABLET | Refills: 0 | Status: SHIPPED | OUTPATIENT
Start: 2025-08-19 | End: 2025-09-18

## 2025-08-20 ENCOUNTER — HOSPITAL ENCOUNTER (OUTPATIENT)
Dept: RADIOLOGY | Facility: OTHER | Age: 50
Discharge: HOME | End: 2025-08-20
Attending: INTERNAL MEDICINE
Payer: COMMERCIAL

## 2025-08-20 DIAGNOSIS — G89.29 CHRONIC RIGHT SHOULDER PAIN: ICD-10-CM

## 2025-08-20 DIAGNOSIS — M75.01 ADHESIVE CAPSULITIS OF RIGHT SHOULDER: ICD-10-CM

## 2025-08-20 DIAGNOSIS — M25.511 CHRONIC RIGHT SHOULDER PAIN: ICD-10-CM

## 2025-08-20 PROCEDURE — 73030 X-RAY EXAM OF SHOULDER: CPT | Mod: TC,RT

## 2025-08-20 PROCEDURE — 73030 X-RAY EXAM OF SHOULDER: CPT | Mod: 26,RT,, | Performed by: RADIOLOGY

## 2025-08-25 DIAGNOSIS — G89.29 CHRONIC RIGHT SHOULDER PAIN: ICD-10-CM

## 2025-08-25 DIAGNOSIS — M75.01 ADHESIVE CAPSULITIS OF RIGHT SHOULDER: ICD-10-CM

## 2025-08-25 DIAGNOSIS — M25.511 CHRONIC RIGHT SHOULDER PAIN: ICD-10-CM

## 2025-08-25 RX ORDER — MELOXICAM 15 MG/1
15 TABLET ORAL DAILY
Qty: 30 TABLET | Refills: 0 | Status: SHIPPED | OUTPATIENT
Start: 2025-08-25

## 2025-08-26 ENCOUNTER — OFFICE VISIT (OUTPATIENT)
Dept: ORTHOPEDICS | Facility: CLINIC | Age: 50
End: 2025-08-26
Payer: COMMERCIAL

## 2025-08-26 VITALS
WEIGHT: 293 LBS | HEIGHT: 69 IN | HEART RATE: 68 BPM | SYSTOLIC BLOOD PRESSURE: 123 MMHG | DIASTOLIC BLOOD PRESSURE: 86 MMHG | BODY MASS INDEX: 43.4 KG/M2

## 2025-08-26 DIAGNOSIS — M19.019 ACROMIOCLAVICULAR JOINT ARTHRITIS, UNSPECIFIED LATERALITY: Primary | ICD-10-CM

## 2025-08-26 PROCEDURE — 99999 PR PBB SHADOW E&M-EST. PATIENT-LVL III: CPT | Mod: PBBFAC,,,

## 2025-08-26 RX ORDER — TRIAMCINOLONE ACETONIDE 40 MG/ML
40 INJECTION, SUSPENSION INTRA-ARTICULAR; INTRAMUSCULAR
Status: COMPLETED | OUTPATIENT
Start: 2025-08-26 | End: 2025-08-26

## 2025-08-26 RX ADMIN — TRIAMCINOLONE ACETONIDE 40 MG: 40 INJECTION, SUSPENSION INTRA-ARTICULAR; INTRAMUSCULAR at 12:08

## 2025-08-26 RX ADMIN — TRIAMCINOLONE ACETONIDE 40 MG: 40 INJECTION, SUSPENSION INTRA-ARTICULAR; INTRAMUSCULAR at 11:08

## 2025-09-05 DIAGNOSIS — E03.9 HYPOTHYROIDISM, UNSPECIFIED TYPE: ICD-10-CM

## 2025-09-05 RX ORDER — LEVOTHYROXINE SODIUM 200 UG/1
TABLET ORAL
Qty: 102 TABLET | Refills: 3 | Status: SHIPPED | OUTPATIENT
Start: 2025-09-05